# Patient Record
Sex: FEMALE
[De-identification: names, ages, dates, MRNs, and addresses within clinical notes are randomized per-mention and may not be internally consistent; named-entity substitution may affect disease eponyms.]

---

## 2017-01-20 ENCOUNTER — APPOINTMENT (OUTPATIENT)
Dept: OPHTHALMOLOGY | Facility: CLINIC | Age: 72
End: 2017-01-20

## 2017-07-21 ENCOUNTER — APPOINTMENT (OUTPATIENT)
Dept: OPHTHALMOLOGY | Facility: CLINIC | Age: 72
End: 2017-07-21

## 2018-02-02 ENCOUNTER — APPOINTMENT (OUTPATIENT)
Dept: OPHTHALMOLOGY | Facility: CLINIC | Age: 73
End: 2018-02-02
Payer: MEDICARE

## 2018-02-02 PROCEDURE — 92226: CPT | Mod: 50

## 2018-02-02 PROCEDURE — 92014 COMPRE OPH EXAM EST PT 1/>: CPT

## 2018-08-10 ENCOUNTER — APPOINTMENT (OUTPATIENT)
Dept: OPHTHALMOLOGY | Facility: CLINIC | Age: 73
End: 2018-08-10
Payer: MEDICARE

## 2018-08-10 PROCEDURE — 92014 COMPRE OPH EXAM EST PT 1/>: CPT

## 2018-08-10 PROCEDURE — 92083 EXTENDED VISUAL FIELD XM: CPT

## 2018-08-10 PROCEDURE — 92133 CPTRZD OPH DX IMG PST SGM ON: CPT

## 2019-02-08 ENCOUNTER — APPOINTMENT (OUTPATIENT)
Dept: OPHTHALMOLOGY | Facility: CLINIC | Age: 74
End: 2019-02-08
Payer: MEDICARE

## 2019-02-08 PROCEDURE — 92014 COMPRE OPH EXAM EST PT 1/>: CPT

## 2019-07-24 ENCOUNTER — HOSPITAL ENCOUNTER (OUTPATIENT)
Dept: HOSPITAL 74 - JASU-ENDO | Age: 74
Discharge: HOME | End: 2019-07-24
Payer: COMMERCIAL

## 2019-08-23 ENCOUNTER — NON-APPOINTMENT (OUTPATIENT)
Age: 74
End: 2019-08-23

## 2019-08-23 ENCOUNTER — APPOINTMENT (OUTPATIENT)
Dept: OPHTHALMOLOGY | Facility: CLINIC | Age: 74
End: 2019-08-23
Payer: MEDICARE

## 2019-08-23 PROCEDURE — 92014 COMPRE OPH EXAM EST PT 1/>: CPT

## 2019-08-27 ENCOUNTER — HOSPITAL ENCOUNTER (EMERGENCY)
Dept: HOSPITAL 74 - JER | Age: 74
Discharge: HOME | End: 2019-08-27
Payer: COMMERCIAL

## 2019-08-27 VITALS — TEMPERATURE: 98.4 F | HEART RATE: 84 BPM | SYSTOLIC BLOOD PRESSURE: 134 MMHG | DIASTOLIC BLOOD PRESSURE: 78 MMHG

## 2019-08-27 VITALS — BODY MASS INDEX: 34.7 KG/M2

## 2019-08-27 DIAGNOSIS — Z87.19: ICD-10-CM

## 2019-08-27 DIAGNOSIS — R51: Primary | ICD-10-CM

## 2019-08-27 DIAGNOSIS — I10: ICD-10-CM

## 2019-08-27 LAB
ALBUMIN SERPL-MCNC: 4.2 G/DL (ref 3.4–5)
ALP SERPL-CCNC: 81 U/L (ref 45–117)
ALT SERPL-CCNC: 19 U/L (ref 13–61)
ANION GAP SERPL CALC-SCNC: 8 MMOL/L (ref 8–16)
AST SERPL-CCNC: 12 U/L (ref 15–37)
BASOPHILS # BLD: 0.6 % (ref 0–2)
BILIRUB SERPL-MCNC: 0.4 MG/DL (ref 0.2–1)
BUN SERPL-MCNC: 12.8 MG/DL (ref 7–18)
CALCIUM SERPL-MCNC: 9.7 MG/DL (ref 8.5–10.1)
CHLORIDE SERPL-SCNC: 108 MMOL/L (ref 98–107)
CO2 SERPL-SCNC: 27 MMOL/L (ref 21–32)
CREAT SERPL-MCNC: 0.8 MG/DL (ref 0.55–1.3)
DEPRECATED RDW RBC AUTO: 13.6 % (ref 11.6–15.6)
EOSINOPHIL # BLD: 0.9 % (ref 0–4.5)
GLUCOSE SERPL-MCNC: 97 MG/DL (ref 74–106)
HCT VFR BLD CALC: 39 % (ref 32.4–45.2)
HGB BLD-MCNC: 13.4 GM/DL (ref 10.7–15.3)
LYMPHOCYTES # BLD: 37 % (ref 8–40)
MCH RBC QN AUTO: 30.5 PG (ref 25.7–33.7)
MCHC RBC AUTO-ENTMCNC: 34.3 G/DL (ref 32–36)
MCV RBC: 89 FL (ref 80–96)
MONOCYTES # BLD AUTO: 4.5 % (ref 3.8–10.2)
NEUTROPHILS # BLD: 57 % (ref 42.8–82.8)
PLATELET # BLD AUTO: 252 K/MM3 (ref 134–434)
PMV BLD: 8.3 FL (ref 7.5–11.1)
POTASSIUM SERPLBLD-SCNC: 3.8 MMOL/L (ref 3.5–5.1)
PROT SERPL-MCNC: 8 G/DL (ref 6.4–8.2)
RBC # BLD AUTO: 4.38 M/MM3 (ref 3.6–5.2)
SODIUM SERPL-SCNC: 143 MMOL/L (ref 136–145)
WBC # BLD AUTO: 6.6 K/MM3 (ref 4–10)

## 2019-08-27 PROCEDURE — 3E0333Z INTRODUCTION OF ANTI-INFLAMMATORY INTO PERIPHERAL VEIN, PERCUTANEOUS APPROACH: ICD-10-PCS | Performed by: EMERGENCY MEDICINE

## 2019-08-27 PROCEDURE — 3E033NZ INTRODUCTION OF ANALGESICS, HYPNOTICS, SEDATIVES INTO PERIPHERAL VEIN, PERCUTANEOUS APPROACH: ICD-10-PCS | Performed by: EMERGENCY MEDICINE

## 2019-08-27 PROCEDURE — 3E033GC INTRODUCTION OF OTHER THERAPEUTIC SUBSTANCE INTO PERIPHERAL VEIN, PERCUTANEOUS APPROACH: ICD-10-PCS | Performed by: EMERGENCY MEDICINE

## 2019-08-27 NOTE — PDOC
History of Present Illness





- General


Chief Complaint: Headache


Stated Complaint: HEADACHE


Time Seen by Provider: 08/27/19 08:01





- History of Present Illness


Initial Comments: 





08/27/19 11:13


74 years old with past medical history significant for hypertension presents to 

the emergency department with 2 week history of headache. Patient gets daily 

headaches has had similar headaches to this in the past one lasting this long. 

Has been seen by a neurologist as well as a neurosurgeon had an MRI performed 

in April may which demonstrated a bone spur in her neck. Presents ED with 

moderate headache persistent constant all over her head. No are no nausea no 

vomiting no weakness no numbness no other neurologic findings. No fevers no 

chills no rashes





Symptoms are moderate persistent concent no clear exacerbating or alleviating 

factors.














Past History





- Past Medical History


Allergies/Adverse Reactions: 


 Allergies











Allergy/AdvReac Type Severity Reaction Status Date / Time


 


No Known Allergies Allergy   Verified 08/27/19 07:55











Home Medications: 


Ambulatory Orders





Aspirin [Ecotrin] 81 mg PO DAILY 07/23/19 


Docusate Sodium [Colace] 100 mg PO DAILY 07/23/19 


Esomeprazole Magnesium 20 mg PO DAILY 07/23/19 


Magnesium 200 mg PO DAILY 07/23/19 








Anemia: No


Asthma: No


Cancer: No


Cardiac Disorders: No


CVA: No


COPD: No


CHF: No


Dementia: No


Diabetes: No


GI Disorders: Yes (DIVERTICULOSIS,ACID REFLUX)


 Disorders: No


HTN: Yes


Hypercholesterolemia: Yes


Liver Disease: No


Seizures: No


Thyroid Disease: No





- Surgical History


Abdominal Surgery: No


Appendectomy: No


Cardiac Surgery: No


Cholecystectomy: Yes


Lung Surgery: No


Neurologic Surgery: No


Orthopedic Surgery: Yes (RT KNEE ARTHROSCOPY)





- Suicide/Smoking/Psychosocial Hx


Smoking Status: No


Smoking History: Never smoked


Number of Cigarettes Smoked Daily: 0


Hx Alcohol Use: No


Drug/Substance Use Hx: No


Substance Use Type: None


Hx Substance Use Treatment: No





**Review of Systems





- Review of Systems


Comments:: 





08/27/19 11:13





ROS:  A complete review of 10 out of 10 review of systems is taken and is 

negative apart from what is previously mentioned below and in the HPI.








*Physical Exam





- Vital Signs


 Last Vital Signs











Temp Pulse Resp BP Pulse Ox


 


 97.5 F L  96 H  18   147/99   99 


 


 08/27/19 07:52  08/27/19 07:52  08/27/19 07:52  08/27/19 07:52  08/27/19 07:52














- Physical Exam


Comments: 





08/27/19 11:13








Vitals: Triage Vital signs reviewed  


General Appearance:  no acute distress, well nourished well developed, 


Head: Atraumatic, 


Eyes: Pupils equal reactive round, extraocular movement intact


Neck:  Supple;No Nucal rigidity


Chest Wall: Nontender


Cardiac: Regular rate and rhythym, no murmurs, no rubs, no gallops, 


Lungs: Clear to auscultation bilateral, good air movement bilaterally,


Abdomen:  Soft, non distended, normal bowel sounds, non tender to palpation


Extremities: Full range of motion to all extremities, no cyanosis, clubbing, or 

edema


Skin:  Warm and dry, no rashes or lesions, no rash, no petechiae


Neuro: AOX3; Cranial Nerves 2-12 grossly intact, Strength intact to all 

extremities, Sensation intact to all extremities,gait normal


Psych:  normal mood, normal affect








ED Treatment Course





- LABORATORY


CBC & Chemistry Diagram: 


 08/27/19 08:30





 08/27/19 08:30





Medical Decision Making





- Medical Decision Making





08/27/19 11:14





Well-appearing no apparent distress no red flags on patient's headache history. 

We'll treat with IV fluids Reglan and Benadryl observe and reassess





Reevaluation 11 AM patient feels much better headache improving. Patient would 

like referral to a new neurologist





She will return to ED for any severe worsening symptoms one dose Decadron to 

prevent rebound headache given prior to discharge





Findings, the need for follow-up and strict return instructions discussed with 

patient.








*DC/Admit/Observation/Transfer


Diagnosis at time of Disposition: 


Headache


Qualifiers:


 Headache type: unspecified Headache chronicity pattern: chronic headache 

Intractability: not intractable Qualified Code(s): R51 - Headache








- Discharge Dispostion


Disposition: HOME


Condition at time of disposition: Good


Decision to Admit order: No





- Referrals


Referrals: 


Patience Dorman MD [Primary Care Provider] - 


Jose Barroso MD [Staff Physician] - 





- Patient Instructions


Printed Discharge Instructions:  DI for Headache


Additional Instructions: 


Take your home educations as prescribed. Follow-up with Dr. Barroso neurology 

this week. Return to the emergency department for any severe worsening symptoms 

or for any concerns.





- Post Discharge Activity

## 2019-12-19 ENCOUNTER — HOSPITAL ENCOUNTER (EMERGENCY)
Dept: HOSPITAL 74 - JER | Age: 74
Discharge: HOME | End: 2019-12-19
Payer: COMMERCIAL

## 2019-12-19 VITALS — HEART RATE: 74 BPM | DIASTOLIC BLOOD PRESSURE: 69 MMHG | SYSTOLIC BLOOD PRESSURE: 124 MMHG

## 2019-12-19 VITALS — TEMPERATURE: 98.4 F

## 2019-12-19 VITALS — BODY MASS INDEX: 35 KG/M2

## 2019-12-19 DIAGNOSIS — K21.9: ICD-10-CM

## 2019-12-19 DIAGNOSIS — I10: ICD-10-CM

## 2019-12-19 DIAGNOSIS — K57.92: Primary | ICD-10-CM

## 2019-12-19 LAB
ALBUMIN SERPL-MCNC: 4 G/DL (ref 3.4–5)
ALP SERPL-CCNC: 86 U/L (ref 45–117)
ALT SERPL-CCNC: 25 U/L (ref 13–61)
ANION GAP SERPL CALC-SCNC: 6 MMOL/L (ref 8–16)
APPEARANCE UR: CLEAR
AST SERPL-CCNC: 12 U/L (ref 15–37)
BACTERIA # UR AUTO: 140.9 /HPF
BILIRUB SERPL-MCNC: 0.5 MG/DL (ref 0.2–1)
BILIRUB UR STRIP.AUTO-MCNC: NEGATIVE MG/DL
BUN SERPL-MCNC: 10.6 MG/DL (ref 7–18)
CALCIUM SERPL-MCNC: 9.7 MG/DL (ref 8.5–10.1)
CASTS URNS QL MICRO: 11 /LPF (ref 0–8)
CHLORIDE SERPL-SCNC: 106 MMOL/L (ref 98–107)
CO2 SERPL-SCNC: 30 MMOL/L (ref 21–32)
COLOR UR: YELLOW
CREAT SERPL-MCNC: 0.9 MG/DL (ref 0.55–1.3)
DEPRECATED RDW RBC AUTO: 13.8 % (ref 11.6–15.6)
EPITH CASTS URNS QL MICRO: 9.6 /HPF
GLUCOSE SERPL-MCNC: 97 MG/DL (ref 74–106)
HCT VFR BLD CALC: 40.8 % (ref 32.4–45.2)
HGB BLD-MCNC: 13.8 GM/DL (ref 10.7–15.3)
KETONES UR QL STRIP: NEGATIVE
LEUKOCYTE ESTERASE UR QL STRIP.AUTO: NEGATIVE
LIPASE SERPL-CCNC: 117 U/L (ref 73–393)
MCH RBC QN AUTO: 30.5 PG (ref 25.7–33.7)
MCHC RBC AUTO-ENTMCNC: 33.9 G/DL (ref 32–36)
MCV RBC: 90.1 FL (ref 80–96)
NITRITE UR QL STRIP: NEGATIVE
PH UR: 5 [PH] (ref 5–8)
PLATELET # BLD AUTO: 253 K/MM3 (ref 134–434)
PMV BLD: 8.2 FL (ref 7.5–11.1)
POTASSIUM SERPLBLD-SCNC: 3.8 MMOL/L (ref 3.5–5.1)
PROT SERPL-MCNC: 7.8 G/DL (ref 6.4–8.2)
PROT UR QL STRIP: NEGATIVE
PROT UR QL STRIP: NEGATIVE
RBC # BLD AUTO: 4.53 M/MM3 (ref 3.6–5.2)
RBC # BLD AUTO: 6 /HPF (ref 0–4)
SODIUM SERPL-SCNC: 141 MMOL/L (ref 136–145)
SP GR UR: 1.02 (ref 1.01–1.03)
UROBILINOGEN UR STRIP-MCNC: 1 MG/DL (ref 0.2–1)
WBC # BLD AUTO: 8.5 K/MM3 (ref 4–10)
WBC # UR AUTO: 2 /HPF (ref 0–5)

## 2019-12-19 PROCEDURE — 3E0337Z INTRODUCTION OF ELECTROLYTIC AND WATER BALANCE SUBSTANCE INTO PERIPHERAL VEIN, PERCUTANEOUS APPROACH: ICD-10-PCS | Performed by: EMERGENCY MEDICINE

## 2019-12-19 NOTE — PDOC
Documentation entered by Susie Machado SCRIBE, acting as scribe for Kamron Perez MD.








Kamron Perez MD:  This documentation has been prepared by the юлияibeCarter Lincy, SCRIBE, under my direction and personally reviewed by me in its 

entirety.  I confirm that the documentation accurately reflects all work, 

treatment, procedures, and medical decision making performed by me.  





Attending Attestation





- Resident


Resident Name: MiltonMitul





- ED Attending Attestation


I have performed the following: I have examined & evaluated the patient, The 

case was reviewed & discussed with the resident, I agree w/resident's findings 

& plan, Exceptions are as noted





- HPI


HPI: 


12/19/19 09:36


The patient is a 74-year-old female with a past medical history significant for 

hx of diverticulosis, HTN, and migraines who presents to the emergency 

department with 2 weeks of worsening suprapubic, left lower quadrant and left 

back pain. The patient reports associated symptoms of nausea and an episode of 

bright red blood noted on toilet paper earlier today. The patient reports 

speaking with Dr. Molina, who referred the patient to the ER for further 

management. 





Allergies: NKDA


Social history: No reported history of tobacco, alcohol or recreational drug 

use. 


Surgical history: R. knee arthroscopy and cholecystectomy. 


PCP: Dr. JOSE ANTONIO Dorman. 





- Physicial Exam


PE: 


12/19/19 10:00


Vitals: Triage vital signs reviewed


General Appearance: No acute distress, well nourished, well developed


Cardiac: Regular rate and rhythm, no murmurs, no rubs, no gallops


Lungs: Clear to auscultation bilaterally, good air movement bilaterally


Abdomen:  +Left lower quadrant tenderness. Soft, nondistended, normal bowel 

sounds. 


Extremities: Full range of motion to all extremities, no cyanosis, clubbing, or 

edema


Skin:  Warm and dry, no rashes or lesions, no rash, no petechiae


Psych: Normal mood, normal affect. 





- Medical Decision Making





12/19/19 17:01


History and examination consistent with diverticulitis CAT scan demonstrates 

diverticulitis no fever no white count patient well-appearing no apparent 

distress but initial lactate elevated 3.5 this is secondary to diarrhea and 

volume loss





Patient given 2 L normal saline her lactate was rechecked and has now 

normalized reevaluation patient feels well we will discharge home on p.o. 

antibiotics with strict return instructions





Findings, the need for follow-up and strict return instructions discussed with 

patient.





Discharge





- Discharge Information


Problems reviewed: No


Clinical Impression/Diagnosis: 


 Diverticulitis





Disposition: HOME





- Admission


No





- Follow up/Referral


Referrals: 


Patience Dorman MD [Primary Care Provider] - 





- Patient Discharge Instructions


Patient Printed Discharge Instructions:  Diverticulitis


Additional Instructions: 


Take antibiotics as prescribed.  Take with an over-the-counter probiotic.  

Return to ED for any fever severe worsening symptoms or for any concerns.  

Follow-up with your doctor within 1 week.





- Post Discharge Activity

## 2019-12-19 NOTE — PDOC
History of Present Illness





- General


Chief Complaint: Pain


Stated Complaint: ABD PAIN


Time Seen by Provider: 12/19/19 08:35


History Source: Patient


Exam Limitations: No Limitations





- History of Present Illness


Initial Comments: 


73 y/o F, pmh of htn, constipation, migraines, GERD, presents today with diffuse

, intermittent, sharp abdominal pain radiating to the suprapubic region of 1 

week duration that is relieved by motrin, associated nbnb, semisolid diarrhea. 

Pt reports that the pain has worsened since onset, and usually resolves after 

moving her bowel. She reports that the diarrhea has also worsened, which she 

attributed to taking miralax daily. In the past, she has been seeing Dr. Roy and had a colonoscopy + endoscopy showing no significant findings. She 

also reports one episode of blood streaked stool on toilet paper. Pt Admits to 

mild chest pain, chills, diarrhea, dysuria and nausea. Shes denies f/v/sob, 

numbness or tingling, back pain, recent travels. 





12/19/19 10:10





12/19/19 10:14





12/19/19 10:21





12/19/19 10:23





12/19/19 10:34





Severity: moderate


Associated Symptoms: reports: chest pain, headaches, nausea/vomiting.  denies: 

diaphoresis, fever/chills, loss of appetite, rash, shortness of breath


Aspirin Received prior to arrival: Yes: no aspirin today





Past History





- Travel


Traveled outside of the country in the last 30 days: No


Close contact w/someone who was outside of country & ill: No





- Past Medical History


Allergies/Adverse Reactions: 


 Allergies











Allergy/AdvReac Type Severity Reaction Status Date / Time


 


No Known Allergies Allergy   Verified 12/19/19 08:24











Home Medications: 


Ambulatory Orders





Aspirin [Ecotrin] 81 mg PO DAILY 07/23/19 


Docusate Sodium [Colace] 100 mg PO DAILY 07/23/19 


Esomeprazole Magnesium 20 mg PO DAILY 07/23/19 


Magnesium 200 mg PO DAILY 07/23/19 


Ciprofloxacin 500 mg PO BID 7 Days #14 ml 12/19/19 


Metronidazole 500 mg PO TID 7 Days #21 tablet 12/19/19 








Anemia: No


Asthma: No


Cancer: No


Cardiac Disorders: No


CVA: No


COPD: No


CHF: No


Dementia: No


Diabetes: No


GI Disorders: Yes (DIVERTICULOSIS,ACID REFLUX)


 Disorders: No


HTN: Yes


Hypercholesterolemia: Yes


Liver Disease: No


Seizures: No


Thyroid Disease: No





- Surgical History


Abdominal Surgery: No


Appendectomy: No


Cardiac Surgery: No


Cholecystectomy: Yes


Lung Surgery: No


Neurologic Surgery: No


Orthopedic Surgery: Yes (RT KNEE ARTHROSCOPY)





- Psycho Social/Smoking Cessation Hx


Smoking Status: No


Smoking History: Never smoked


Number of Cigarettes Smoked Daily: 0


Information on smoking cessation initiated: No


Hx Alcohol Use: No


Drug/Substance Use Hx: No


Substance Use Type: None


Hx Substance Use Treatment: No





**Review of Systems





- Review of Systems


Able to Perform ROS?: Yes


Is the patient limited English proficient: No


Constitutional: Yes: Chills, Weight Stable.  No: Diaphoresis, Fever, Loss of 

Appetite


HEENTM: No: Blurred Vision, Difficulty Swallowing


Respiratory: No: Cough, Shortness of Breath, SOB with Exertion, Wheezing, 

Productive cough


Cardiac (ROS): Yes: Chest Pain.  No: Palpitations, Chest Tightness


ABD/GI: Yes: Blood Streaked Bowels, Diarrhea, Nausea.  No: Abdominal Distended, 

Constipated, Rectal Bleeding, Vomiting, Abdominal cramping, Tarry Stools


: Yes: Burning, Dysuria


Musculoskeletal: Yes: Back Pain.  No: Muscle Pain, Joint Stiffness


Neurological: Yes: Headache.  No: Weakness





*Physical Exam





- Vital Signs


 Last Vital Signs











Temp Pulse Resp BP Pulse Ox


 


    111 H  19   134/87   98 


 


    12/19/19 08:22  12/19/19 08:22  12/19/19 08:22  12/19/19 08:22














- Physical Exam


General Appearance: Yes: Nourished, Appropriately Dressed


HEENT: positive: EOMI, ZUNILDA, Normal ENT Inspection, Pharynx Normal


Neck: positive: Trachea midline, Normal Thyroid, Supple


Respiratory/Chest: positive: Lungs Clear, Normal Breath Sounds.  negative: 

Chest Tender, Crackles, Rales, Wheezing


Cardiovascular: positive: Regular Rhythm, Regular Rate, S1, S2, Tachycardia.  

negative: Murmur, Gallop/S3, Gallop/S4


Vascular Pulses: Dorsalis-Pedis (R): 2+, Doralis-Pedis (L): 2+


Gastrointestinal/Abdominal: positive: Normal Bowel Sounds, Tender, Guarding, 

Tenderness.  negative: Organomegaly


Musculoskeletal: positive: CVA Tenderness (Left sided)


Extremity: positive: Normal Inspection, Normal Range of Motion


Neurologic: positive: CNs II-XII NML intact, Fully Oriented, Alert, Normal Mood/

Affect





ED Treatment Course





- LABORATORY


CBC & Chemistry Diagram: 


 12/19/19 10:24





 12/19/19 10:24





- RADIOLOGY


Radiology Studies Ordered: 














 Category Date Time Status


 


 ABDOMEN & PELVIS CT WITH CONTR [CT] Stat CT Scan  12/19/19 09:21 Ordered














Medical Decision Making





- Medical Decision Making


12/19/19 1





73 y/o F, pmh of htn, constipation, migraines, GERD, presents today with diffuse

, intermittent, sharp abdominal pain radiating to the suprapubic region of 1 

week duration, associated nbnb, semisolid diarrhea 





#Abdominal pain 


likely 2/2 Diverticulitis 


r/o acute pathology due to increased risk, 


(>66 yo pts w/ abdominal pain at 10% risk of mortality)


previous Endoscopy shows multiple diverticulosis 


LLQ >RLL pain


CT a/p w/ IV contrast ordered


Lactate orderd to r/o bowel perforation vs ischemia


Lipase


CBC, CMP


Can give tylenol for pain





#Dysuria


likely 2/2 to UTI vs pyelo 


suprapubic tenderness + left CVA tenderness


UA + UCx ordered





#Chest pain


unlikely ACS, but must r/o


EKG


Trops ordered





#Hematochezia


Rectal exam


Heme occult ordered





FEN:


monitor lytes





Dispo: f/u CT a/p w/ IV contrast, f/u labs








Discharge





- Discharge Information


Problems reviewed: Yes


Clinical Impression/Diagnosis: 


 Diverticulitis





Condition: Improved


Disposition: HOME





- Additional Discharge Information


Prescriptions: 


Ciprofloxacin 500 mg PO BID 7 Days #14 ml


Metronidazole 500 mg PO TID 7 Days #21 tablet





- Follow up/Referral


Referrals: 


Patience Dorman MD [Primary Care Provider] - 





- Patient Discharge Instructions


Patient Printed Discharge Instructions:  Diverticulitis


Additional Instructions: 


Take antibiotics as prescribed.  Take with an over-the-counter probiotic.  

Return to ED for any fever severe worsening symptoms or for any concerns.  

Follow-up with your doctor within 1 week.





- Post Discharge Activity

## 2019-12-19 NOTE — EKG
Test Reason : 

Blood Pressure : ***/*** mmHG

Vent. Rate : 077 BPM     Atrial Rate : 077 BPM

   P-R Int : 158 ms          QRS Dur : 082 ms

    QT Int : 402 ms       P-R-T Axes : 041 -25 010 degrees

   QTc Int : 454 ms

 

NORMAL SINUS RHYTHM

POSSIBLE LEFT ATRIAL ENLARGEMENT

BORDERLINE ECG

WHEN COMPARED WITH ECG OF 08-DEC-2016 10:01,

FUSION COMPLEXES ARE NO LONGER PRESENT

Confirmed by ALICE JACOBS, DANGELO ([id number]) on 12/19/2019 11:53:01 AM

 

Referred By:             Confirmed By:DANGELO JENKINS MD

## 2020-02-14 ENCOUNTER — APPOINTMENT (OUTPATIENT)
Dept: OPHTHALMOLOGY | Facility: CLINIC | Age: 75
End: 2020-02-14
Payer: MEDICARE

## 2020-02-14 ENCOUNTER — NON-APPOINTMENT (OUTPATIENT)
Age: 75
End: 2020-02-14

## 2020-02-14 PROCEDURE — 92133 CPTRZD OPH DX IMG PST SGM ON: CPT

## 2020-02-14 PROCEDURE — 92012 INTRM OPH EXAM EST PATIENT: CPT

## 2020-02-14 PROCEDURE — 92083 EXTENDED VISUAL FIELD XM: CPT

## 2020-08-21 ENCOUNTER — APPOINTMENT (OUTPATIENT)
Dept: OPHTHALMOLOGY | Facility: CLINIC | Age: 75
End: 2020-08-21

## 2020-08-21 ENCOUNTER — NON-APPOINTMENT (OUTPATIENT)
Age: 75
End: 2020-08-21

## 2020-08-21 ENCOUNTER — APPOINTMENT (OUTPATIENT)
Dept: OPHTHALMOLOGY | Facility: CLINIC | Age: 75
End: 2020-08-21
Payer: MEDICARE

## 2020-08-21 PROCEDURE — 92014 COMPRE OPH EXAM EST PT 1/>: CPT

## 2020-11-02 ENCOUNTER — HOSPITAL ENCOUNTER (EMERGENCY)
Dept: HOSPITAL 74 - JER | Age: 75
Discharge: HOME | End: 2020-11-02
Payer: COMMERCIAL

## 2020-11-02 VITALS — TEMPERATURE: 98.2 F

## 2020-11-02 VITALS — BODY MASS INDEX: 32.3 KG/M2

## 2020-11-02 VITALS — DIASTOLIC BLOOD PRESSURE: 74 MMHG | SYSTOLIC BLOOD PRESSURE: 135 MMHG

## 2020-11-02 VITALS — HEART RATE: 74 BPM

## 2020-11-02 DIAGNOSIS — R10.31: Primary | ICD-10-CM

## 2020-11-02 LAB
ALBUMIN SERPL-MCNC: 4.1 G/DL (ref 3.4–5)
ALP SERPL-CCNC: 78 U/L (ref 45–117)
ALT SERPL-CCNC: 17 U/L (ref 13–61)
ANION GAP SERPL CALC-SCNC: 7 MMOL/L (ref 8–16)
APPEARANCE UR: (no result)
APTT BLD: 28.4 SECONDS (ref 25.2–36.5)
AST SERPL-CCNC: 15 U/L (ref 15–37)
BASOPHILS # BLD: 0.3 % (ref 0–2)
BILIRUB SERPL-MCNC: 0.4 MG/DL (ref 0.2–1)
BILIRUB UR STRIP.AUTO-MCNC: NEGATIVE MG/DL
BUN SERPL-MCNC: 8.6 MG/DL (ref 7–18)
CALCIUM SERPL-MCNC: 10.1 MG/DL (ref 8.5–10.1)
CHLORIDE SERPL-SCNC: 108 MMOL/L (ref 98–107)
CO2 SERPL-SCNC: 27 MMOL/L (ref 21–32)
COLOR UR: YELLOW
CREAT SERPL-MCNC: 0.8 MG/DL (ref 0.55–1.3)
DEPRECATED RDW RBC AUTO: 13.8 % (ref 11.6–15.6)
EOSINOPHIL # BLD: 0.7 % (ref 0–4.5)
GLUCOSE SERPL-MCNC: 91 MG/DL (ref 74–106)
HCT VFR BLD CALC: 41.7 % (ref 32.4–45.2)
HGB BLD-MCNC: 13.7 GM/DL (ref 10.7–15.3)
INR BLD: 1.18 (ref 0.83–1.09)
KETONES UR QL STRIP: (no result)
LEUKOCYTE ESTERASE UR QL STRIP.AUTO: NEGATIVE
LYMPHOCYTES # BLD: 30.9 % (ref 8–40)
MCH RBC QN AUTO: 29.7 PG (ref 25.7–33.7)
MCHC RBC AUTO-ENTMCNC: 33 G/DL (ref 32–36)
MCV RBC: 90 FL (ref 80–96)
MONOCYTES # BLD AUTO: 5.1 % (ref 3.8–10.2)
NEUTROPHILS # BLD: 63 % (ref 42.8–82.8)
NITRITE UR QL STRIP: NEGATIVE
PH UR: 5 [PH] (ref 5–8)
PLATELET # BLD AUTO: 242 K/MM3 (ref 134–434)
PMV BLD: 8.3 FL (ref 7.5–11.1)
POTASSIUM SERPLBLD-SCNC: 4.5 MMOL/L (ref 3.5–5.1)
PROT SERPL-MCNC: 7.8 G/DL (ref 6.4–8.2)
PROT UR QL STRIP: (no result)
PROT UR QL STRIP: NEGATIVE
PT PNL PPP: 14.2 SEC (ref 9.7–13)
RBC # BLD AUTO: 4.63 M/MM3 (ref 3.6–5.2)
SODIUM SERPL-SCNC: 142 MMOL/L (ref 136–145)
SP GR UR: 1.02 (ref 1.01–1.03)
UROBILINOGEN UR STRIP-MCNC: 0.2 MG/DL (ref 0.2–1)
WBC # BLD AUTO: 8.6 K/MM3 (ref 4–10)

## 2020-11-02 PROCEDURE — 3E0337Z INTRODUCTION OF ELECTROLYTIC AND WATER BALANCE SUBSTANCE INTO PERIPHERAL VEIN, PERCUTANEOUS APPROACH: ICD-10-PCS

## 2020-11-02 PROCEDURE — 3E0333Z INTRODUCTION OF ANTI-INFLAMMATORY INTO PERIPHERAL VEIN, PERCUTANEOUS APPROACH: ICD-10-PCS

## 2020-11-02 NOTE — PDOC
Attending Attestation





- Resident


Resident Name: PaddymanuelClarke





- ED Attending Attestation


I have performed the following: I have examined & evaluated the patient, The 

case was reviewed & discussed with the resident, I agree w/resident's findings &

plan, Exceptions are as noted





- HPI


HPI: 





11/02/20 09:47


75 y F hx of diverticulitis, htn, migraines presents with R abd pain since 

yesterday. Pt woke up with the pain, the pain is constaint, radiates up to the R

flank, associated with nausea. pt endorses some constipation and has beeen using

a laxative tea and has been having several episodes of loose watery non 

bloody/non melantic stool. Pt dose endorses some urinary frequency, was recently

on keflex for a UTI from his PMD. 





PMD: Dr. Dorman














- Physicial Exam


PE: 





11/02/20 10:38


GENERAL: The patient is awake, alert, and fully oriented, Nontoxic - in no acute

distress.


HEAD: Normocephalic, atraumatic.


EYES: extraocular movements intact, sclera anicteric, conjunctiva clear.


ENT: Normal voice,  Moist mucous membranes.


NECK: Normal range of motion, supple


LUNGS: Breath sounds equal, clear to auscultation bilaterally.  No wheezes, no 

rhonchi, no rales.


HEART: tachcyardic, normal S1 and S2 without murmur, rub or gallop.


ABDOMEN: Soft, mild RLQ tenderness, No guarding, no rebound.  No CVA tenderness


EXTREMITIES: Normal range of motion, no edema.  


NEUROLOGICAL: No facial assymetry, Normal speech, 


PSYCH: Normal mood, normal affect.


SKIN: Warm, Dry, normal turgor,








- Medical Decision Making





11/02/20 10:42


ddx includes possible appendicitis, kidney stones


will obtain blood work, ua, ekg


analgesia, fluids


likely imaging





11/02/20 10:57


pts labs reviewed


noted for elevated LA to 3.0


will continue to fluid resusitate


will reassess





11/02/20 15:56


Pt feeling improved


CT non diagnostic


LA repeated and was normal


will dc with outpt fu


return precautiosn were discussed











**Heart Score/ECG Review





- ECG Impressions


Comment:: 





11/02/20 11:00


Twelve-lead EKG was performed and reviewed by me. 


There is normal sinus rhythm with a rate of 101


left axis deviation


The intervals are normal. 





Impression: sinus tachcyardia 





Discharge





- Discharge Information


Problems reviewed: Yes


Clinical Impression/Diagnosis: 


 RLQ abdominal pain





Condition: Good


Disposition: HOME





- Admission


No





- Follow up/Referral


Referrals: 


Patience Dorman MD [Primary Care Provider] - 





- Patient Discharge Instructions


Additional Instructions: 


You came to the ED for RLQ pain this is most likely due to diverticulosis or 

musculoskeletal pain. 





At the ED we did labs and imaging which were all benign. We gave you pain meds. 

For this pain follow up with your gastroenterologist within the next few days. 

Please follow up with your Primary care physician as well with this pain. 





For the pain continue taking ibuprofen 400mg every 6 hours or tylenol 325mg-

650mg every 6 hours for pain





For any of these please return:


- worsening abdominal pain


-fevers or chills


- SOB or chest pain


- unable to eat anything by mouth 


For any emergent conditions please call for medical help right away. 





- Post Discharge Activity

## 2020-11-02 NOTE — XMS
Summarization Of Episode

                           Created on:2020



Patient:GARY GILLESPIE

Sex:Female

:1945

External Reference #:[id number]





Demographics







                          Address                   [address]



                                                    [place] [address]

 

                          Home Phone                [phone] CELL

 

                          Work Phone                [phone]

 

                          Email Address             [email or web address]

 

                          Preferred Language        English

 

                          Marital Status            Not  or 

 

                          Anabaptist Affiliation     NO

 

                          Race                      BL

 

                          Ethnic Group              Not  or 









Author







                          Organization              [facility]









Support







                Name            Relationship    Address         Phone

 

                FUAD GILLESPIE   3               NOT AVAIL.PER PATIENT [phone]

 

                RE              Unavailable     Unavailable     Unavailable

 

                BRADY JONES  FRIEND          [address] [phone]



                                                [place] [address] 

 

                EDDA GILLESPIE    SON             [phone] [phone] 

CELL



                                                [place] [id number] 

 

                BRADY JONES  Unavailable     [address]  [phone]



                                                [place] [address] 

 

                FUAD GILLESPIE   Unavailable     NOT AVAIL.PER PATIENT [phone]









Care Team Providers







                    Name                Role                Phone

 

                    Jose Choudhury   Unavailable         Unavailable

 

                    Coron, Parvez        Unavailable         Unavailable

 

                    Coron, Parvez        Unavailable         Unavailable

 

                    Coron, Parvez        Unavailable         Unavailable

 

                    Coron, Parvez        Unavailable         Unavailable









Re-disclosure Warning

The records that you are about to access may contain information from federally-
assisted alcohol or drug abuse programs. If such information is present, then 
the following federally mandated warning applies: This information has been 
disclosed to you from records protected by federal confidentiality rules (42 CFR
part 2). The federal rules prohibit you from making any further disclosure of 
this information unless further disclosure is expressly permitted by the written
consent of the person to whom it pertains or as otherwise permitted by 42 CFR 
part 2. A general authorization for the release of medical or other information 
is NOT sufficient for this purpose. The Federal rules restrict any use of the 
information to criminally investigate or prosecute any alcohol or drug abuse 
patient.The records that you are about to access may contain highly sensitive 
health information, the redisclosure of which is protected by Article 27-F of 
the [facility] Public Health law. If you continue you may haveaccess to 
information: Regarding HIV / AIDS; Provided by facilities licensed or operated 
by the [facility] Office of Mental Health; or Provided by the [facility]
Office for People With Developmental Disabilities. If such information is 
present, then the following [place] mandated warning applies: This 
information has been disclosed to you from confidential records which are 
protected by state law. State law prohibits you from making any further 
disclosure of this information without the specific written consent of the 
person to whom it pertains, or as otherwise permitted by law. Any unauthorized 
further disclosure in violation of state law may result in a fine or [facility]PC 
sentence or both. A general authorization for the release of medical or other 
information is NOT sufficient authorization for further disclosure.



Encounters







           Encounter  Providers  Location   Date       Indications Data Source(s

)

 

           Outpatient Attender: Parvez ICU[facility]  10/21/2020            [facility]



                      Coron                 10:00:00 AM            Hospital



                                            EDT -                 



                                            10/21/2020            



                                            11:59:00 PM            



                                            EDT                   









                                        Patient discharged.









           Outpatient Attender: Jose ICU-LAB    2020 03:33:00 PM       

     [facility]



                      [place]              EDT - 2020            Hospital



                                            11:59:00 PM EDT            









                                        Patient discharged.









           Outpatient Attender: Parvez Olvera ICU-RADIO  2020 02:24:52 PM   

         [facility] 

[facility]



                                            EDT - 2020            Hospital



                                            11:59:00 PM EDT            









                                        Patient discharged.









           Outpatient Attender: Parvez Olvera ICU-LAB    2020 04:23:00 PM   

         [facility] 

[facility]



                                            EDT - 2020            Hospital



                                            11:59:00 PM EDT            









                                        Patient discharged.









           Outpatient Attender: Parvez Olvera ICU-LAB    2020 06:45:18 PM   

         [facility] 

[facility]



                                            EDT - 2020            Hospital



                                            11:59:00 PM EDT            









                                        Patient discharged.









           S          Attender: Parvez Olvera ICU-GI SUITE 2020 03:47:01 PM 

[facility]     [facility] 

[facility]



                                            EDT - 2020            Hospital



                                            10:24:00 AM EDT            









                                        [facility]STRO

 

                                        Patient discharged.







Insurance Providers







          Payer name Policy type / Policy ID Covered   Covered party's Policy   

 Plan



                    Coverage type           party ID  relationship to Rodriguez    

Information



                                                  rodriguez              

 

          [employer] CBP OUTPT           [id number]           SP                  [id number]

[id number]

 

          BC PPO              [id number]           SP                  [id number]

[id number]

 

          MEDICARE            [id number]           SP                  [id number]

[id number]

 

          [place] [employer] [id number]           1                   [id number]

[id number]



          [employer]                                                         



          PPO/EPO/HMO                                                   

 

          Medicare Part Medicare  [id number]           1                   [id number]

[id number]



          B Outpatient                                                   

 

          Blue [employer] Blue Cross [id number]           1                   [id number]

[id number]



          PPO                                                         

 

          BC PPO              [id number]           SP                  [id number]

[id number]

 

          [employer] CBP OUTPT           [id number]           SP                  [id number]

[id number]

 

          MEDICARE            [id number]           SP                  [id number]

A

 

          Blue Cross Blue Cross [id number]           1                   [id number]

[id number]



          PPO                                                         

 

          BC PPO              [id number]           SP                  [id number]

[id number]







Problems, Conditions, and Diagnoses







           Code       Display Name Description Problem Type Effective  Data



                                                       Dates      Source(s)

 

           N28.1      Cyst of kidney, Acquired renal cyst Diagnosis  10/21/2020 

[facility]



                      acquired   of left kidney            10:00:00 AM [facility]

 

           K35.80     Unspecified acute Unspecified acute Diagnosis  2020 

[facility]



                      appendicitis appendicitis            03:33:00 PM [facility]

 

           K57.92     Diverticulitis of Diverticulitis of Diagnosis  2020 

[facility]



                      intestine, part intestine             08:32:00 AM [facility]



                      unspecified, without                       EDT        Hosp

ital



                      perforation or                                  



                      abscess without                                  



                      bleeding                                    

 

           R10.9      Unspecified Abdominal pain, Diagnosis  2020 [facility]

w



                      abdominal pain unspecified            04:23:00 PM [facility]



                                 abdominal location            EDT        Hospit

al

 

           K29.60     Other gastritis Other gastritis Diagnosis  2020 [facility] 

[facility]



                      without bleeding without hemorrhage            08:04:00 AM

 [facility]

 

           K21.0      Gastro-esophageal Gastroesophageal Diagnosis  2020 

[facility]



                      reflux disease with reflux disease with            08:04:0

0 AM [facility]



                      esophagitis esophagitis            EDT        Hospital

 

                      GASTRO     GASTRO     Diagnosis  2020 [facility]



                                                       08:04:00 AM [facility]

 

           [address]      Upper      Upper      Diagnosis  2020 [facility]



                      gastrointestinal gastrointestinal            08:04:00 AM [facility]



                      endoscopy  endoscopy             EDT        Hospital

 

           K44.9      Diaphragmatic hernia Diaphragmatic hernia Diagnosis   [facility]



                      without obstruction without obstruction            08:04:0

0 AM [facility]



                      or gangrene and without gangrene            EDT        Hos

pital

 

           Z11.59     Encounter for Encounter for Diagnosis  2020 [facility]

w



                      screening for other laboratory testing            06:46:00

 AM [facility]



                      viral diseases for COVID-19 virus            EDT        Ho

spital







Surgeries/Procedures







             Procedure    Description  Date         Indications  Data Source(s)

 

             MRI Abdomen with and              10/21/2020                [facility]



             without Contrast MRI              10:10:00 AM EDT              Syst

em



             Abdomen with and without              - 10/21/2020              



             Contrast                  10:10:00 AM EDT              

 

             Venipuncture              2020                [facility]



                                       03:37:39 PM EDT              System



                                       - 2020              



                                       05:00:07 PM EDT              

 

             CT Abdomen and Pelvis              2020                [facility]



             with IV and Barium              11:34:00 AM EDT              System



             Contrast & Recons Rad              - 2020              



             Image                     11:34:00 AM EDT              

 

             Venipuncture              2020                [facility]



                                       04:25:03 PM EDT              System



                                       - 2020              



                                       06:00:10 PM EDT              







Results







                    ID                  Date                Data Source

 

                    [id number]      10/27/2020 02:43:52 AM EDT [facility]

alth System











          Name      Value     Range     Interpretation Description Data      Sup

porting



                                        Code                Source(s) Document(s

)

 

          TissueExam Results for case           Normal (applies [facility]

[facility] 



                    # [id number]           to non-[facility]           Health    



                    SURGICAL            results)            System    



                    PATHOLOGY                                         



                    REPORTCLINICAL                                         



                    INFORMATION:                                         



                    GERD.                                             



                    PREOPERATIVE                                         



                    DIAGNOSIS:                                         



                    Same.POSTOPERATIV                                         



                    E DIAGNOSIS:                                         



                    Gastritis.                                         



                    Hiatal                                            



                    hernia.FINAL                                         



                    DIAGNOSIS:                                         



                    Stomach, antrum,                                         



                    biopsy:Antral and                                         



                    body/fundic type                                         



                    gastric mucosa                                         



                    with chronic                                         



                    inactive                                          



                    gastritis with                                         



                    focal mild                                         



                    glandular atypia,                                         



                    favor reactive                                         



                    change.Negative                                         



                    for Helicobacter                                         



                    pylori on Giemsa                                         



                    stain./Jaiden Ahumada MDElectronically                                         



                    Signed By: GROSS                                         



                    DESCRIPTION: In                                         



                    formalin, labeled                                         



                    "biopsy of                                         



                    gastric                                           



                    antrum&quot;, the                                         



                    specimen consists                                         



                    of three tan soft                                         



                    tissues, ranging                                         



                    from 0.1 x 0.1 x                                         



                    0.1 cm to 0.3 x                                         



                    0.1 x 0.1 cm,                                         



                    which are                                         



                    submitted in toto                                         



                    in one                                            



                    cassette.MV/cmPag                                         



                    e                                           











                    ID                  Date                Data Source

 

                    [id number]      10/27/2020 02:43:52 AM EDT [facility]

alth System











          Name      Value     Range     Interpretation Description Data      Sup

porting



                                        Code                Source(s) Document(s

)

 

          46853-3   NEGATIVE Testing           Normal (applies COVID-19.. [facility]

iore 



                    was performed           to non-numeric           Health Syst

em 



                    using [employer] ID           results)                      



                    NOW COVID-19, an                                         



                    isothermal                                         



                    nucleic acid                                         



                    amplification                                         



                    technology for                                         



                    the qualitative                                         



                    detection of                                         



                    nucleic acid from                                         



                    the SARS-CoV-2                                         



                    viral RNA in                                         



                    respiratory                                         



                    specimens.  The                                         



                    ID NOW COVID-19                                         



                    test has been                                         



                    approved by the                                         



                    Food and Drug                                         



                    Administration                                         



                    (FDA) under an                                         



                    Emergency Use                                         



                    Authorization for                                         



                    use by authorized                                         



                    laboratories.                                         



                    Reference Range:                                         



                    NEGATIVE                                          



                              .                                         











                    ID                  Date                Data Source

 

                    [id number]      10/27/2020 02:43:52 AM EDT [facility]

alth System











          Name      Value     Range     Interpretation Description Data      Sup

porting



                                        Code                Source(s) Document(s

)

 

          TissueExam Results for case #           Normal (applies Tissue Exam [facility]

[facility] 



                    [id number]           to non-numeric           Health    



                    SURGICAL PATHOLOGY           results)            System    



                    REPORTCLINICAL                                         



                    INFORMATION:                                         



                    Gastroesophageal                                         



                    reflux                                            



                    disease.PREOPERATI                                         



                    VE DIAGNOSIS:                                         



                    Same.                                             



                    POSTOPERATIVE                                         



                    DIAGNOSIS:                                         



                    Gastritis, hiatal                                         



                    hernia, rule out                                         



                    Silva's                                         



                    esophagus. FINAL                                         



                    DIAGNOSIS: A:                                         



                    Stomach,                                          



                    biopsy:Transitiona                                         



                    l and oxyntic                                         



                    mucosa with focal                                         



                    lamina propria                                         



                    vascular                                          



                    congestion and                                         



                    regenerative                                         



                    epithelial                                         



                    changes.B: GE                                         



                    junction,                                         



                    biopsy:Esophageal                                         



                    squamous mucosa                                         



                    with mild reflux                                         



                    changes.No                                         



                    glandular mucosa                                         



                    present.GO/stGIBUCK PINTO MDElectronically                                         



                    Signed By: GROSS                                         



                    DESCRIPTION: A: In                                         



                    formalin, labeled                                         



                    "gastric biopsy",                                         



                    the specimen                                         



                    consists of 2 tan                                         



                    soft tissue which                                         



                    are 0.2 x 0.1 x                                         



                    0.1cm and 0.3 x                                         



                    0.1 x 0.1cm which                                         



                    are submitted in                                         



                    toto in one                                         



                    cassette. B: In                                         



                    formalin, labeled                                         



                    "biopsy of                                         



                    gastroesophageal                                         



                    junction", the                                         



                    specimen consists                                         



                    of a 0.1 x 0.1 x                                         



                    0.1cm white pink                                         



                    soft tissue which                                         



                    is submitted in                                         



                    toto in one                                         



                    cassette.MV/stPage                                         



                    2 of 2                                            











                    ID                  Date                Data Source

 

                    [id number]      10/27/2020 02:43:52 AM EDT [facility]

alth System











          Name      Value     Range     Interpretation Description Data      Sup

porting



                                        Code                Source(s) Document(s

)

 

          Sodium    141                 Normal (applies Sodium, Serum [facility]

 



          [Moles/volume mmol/L              to non-numeric           [facility]

em 



          ] in Serum or                     results)                      



          Plasma                                                      

 

          Urea nitrogen 14 mg/dl            Normal (applies Blood Urea [facility]

e 



          [Mass/volume]                     to non-numeric Nitrogen, [facility]

em 



          in Serum or                     results)  Serum               



          Plasma                                                      

 

          Carbon    25.2                Normal (applies CO2, Serum [facility] 



          dioxide,  mmol/L              to non-numeric           [facility] 



          total                         results)                      



          [Moles/volume                                                   



          ] in Serum or                                                   



          Plasma                                                      

 

          Glucose   101                 Normal (applies Glucose, Serum [facility]

e 



          [Mass/volume] mg/dL               to non-numeric           [facility]

em 



          in Serum or                     results)                      



          Plasma                                                      

 

          Chloride  104                 Normal (applies Chloride, [facility] 



          [Moles/volume mmol/L              to non-numeric Serum     [facility]

em 



          ] in Serum or                     results)                      



          Plasma                                                      

 

          Potassium 4.0                 Normal (applies Potassium, [facility] 



          [Mass/volume] mmol/L              to non-numeric Serum     [facility]

em 



          in Serum or                     results)                      



          Plasma                                                      

 

          Anion gap in 11.80               Normal (applies Anion Gap [facility] 



          Serum or  mmol/L              to non-numeric           [facility] System 



          Plasma                        results)                      

 

          Calcium   9.8                 Normal (applies Calcium, Total [facility]

e 



          [Mass/volume] mg/dl               to non-numeric Serum     [facility]

em 



          in Serum or                     results)                      



          Plasma                                                      

 

          Creatinine 0.85                Normal (applies Creatinine, [facility] 



          [Mass/volume] mg/dl               to non-numeric Serum     [facility]

em 



          in Serum or                     results)                      



          Plasma                                                      











                    ID                  Date                Data Source

 

                    [id number]      10/27/2020 02:43:52 AM EDT [facility]

[facility]











          Name      Value     Range     Interpretation Description Data      Sup

porting



                                        Code                Source(s) Document(s

)

 

          Leukocytes 10.1                Normal (applies WBC Count [facility] 



          [#/volume] in {10^3_uL            to non-numeric           Health    



          Unspecified }                   results)            System    



          specimen by                                                   



          Automated count                                                   

 

          Erythrocytes 4.48                Normal (applies RBC Count [facility] 



          [#/volume] in {10^6_uL            to non-numeric           Health    



          Blood by  }                   results)            System    



          Automated count                                                   

 

          Hemoglobin 13.4                Normal (applies Hemoglobin [facility] 



          [Mass/volume] in {gm/dL}             to non-numeric           Health  

  



          Blood                         results)            System    

 

          Erythrocyte mean 91.7 fl             Normal (applies MCV       [facility]

[facility] 



          corpuscular                     to non-numeric           [facility]    



          volume [Entitic                     results)            System    



          volume] by                                                   



          Automated count                                                   

 

          Erythrocyte mean 29.9 pg             Normal (applies MCH       [facility]

[facility] 



          corpuscular                     to non-numeric           [facility]    



          hemoglobin                     results)            System    



          [Entitic mass]                                                   



          by Automated                                                   



          count                                                       

 

          Erythrocyte mean 32.6                Normal (applies MCHC      [facility]

[facility] 



          corpuscular {gm/dL}             to non-numeric           Health    



          hemoglobin                     results)            System    



          concentration                                                   



          [Mass/volume] by                                                   



          Automated count                                                   

 

          Hematocrit 41.1 %              Normal (applies Hematocrit [facility] 



          [Volume                       to non-numeric           Health    



          Fraction] of                     results)            System    



          Blood                                                       

 

          Nucleated 0.0                 Normal (applies NRBC %    [facility] 



          erythrocytes {/100_WB            to non-numeric           Health    



          [#/volume] in C}                  results)            System    



          Body fluid                                                   

 

          Platelets 253                 Normal (applies Platelet Count [facility]

e 



          [#/volume] in {10^3_uL            to non-numeric           Health    



          Plasma by }                   results)            System    



          Automated count                                                   

 

          Erythrocyte 13.4 %              Normal (applies RDW-CV    [facility] 



          distribution                     to non-numeric           Health    



          width [Entitic                     results)            System    



          volume] by                                                   



          Automated count                                                   

 

          Platelet mean 9.7 fl              Normal (applies MPV       [facility]

 



          volume [Entitic                     to non-numeric           Health   

 



          volume] in Blood                     results)            System    



          by Automated                                                   



          count                                                       

 

          NRBC#     0.00                Normal (applies NRBC #    [facility] 



                    {10^3_uL            to non-numeric           Health    



                    }                   results)            System    

 

          Neutrophils/100 65.5 %              Normal (applies Neutrophil % [facility]

tawny 



          leukocytes in                     to non-numeric           Health    



          Blood by                      results)            System    



          Automated count                                                   

 

          Lymphocytes 26.6 %              Normal (applies Lymphocyte % [facility]

e 



          [#/volume] in                     to non-numeric           Health    



          Blood by                      results)            System    



          Automated count                                                   

 

          Neutrophils 6.6                 Normal (applies Neutrophil # [facility]

e 



          [#/volume] in {10^3_uL            to non-numeric           Health    



          Body fluid }                   results)            System    

 

          Monocytes 0.6                 Normal (applies Monocyte # [facility] 



          [#/volume] in {10^3_uL            to non-numeric           Health    



          Blood by Manual }                   results)            System    



          count                                                       

 

          Lymphocyte 2.7                 Normal (applies Lymphocyte # [facility]

 



          percent   {10^3_uL            to non-numeric           Health    



          differential }                   results)            System    



          count                                                       



          (procedure)                                                   

 

          Eosinophils/100 1.7 %               Normal (applies Eosinophil % [facility]

tawny 



          leukocytes in                     to non-numeric           Health    



          Unspecified                     results)            System    



          specimen                                                    

 

          Monocytes/100 5.7 %               Normal (applies Monocyte % [facility]

e 



          leukocytes in                     to non-numeric           Health    



          Blood                         results)            System    

 

          ImmatureGranuloc 0.02                Normal (applies Immature  [facility]

[facility] 



          ytes#     {10^3_uL            to non-numeric Granulocytes # Health    



                    }                   results)            System    

 

          Eosinophils 0.17                Normal (applies Eosinophil # [facility]

e 



          [#/volume] in {10^3_uL            to non-numeric           Health    



          Blood     }                   results)            System    

 

          ImmatureGranuloc 0.2 %               Normal (applies Immature  [facility]

[facility] 



          ytes%                         to non-numeric Granulocytes % Health    



                                        results)            System    

 

          Basophils/100 0.3 %               Normal (applies Basophil % [facility]

e 



          leukocytes in                     to non-numeric           Health    



          Unspecified                     results)            System    



          specimen by                                                   



          Manual count                                                   

 

          Basophils 0.03                Normal (applies Basophil # [facility] 



          [#/volume] in {10^3_uL            to non-numeric           Health    



          Blood by  }                   results)            System    



          Automated count                                                   











                    ID                  Date                Data Source

 

                    [id number]           10/21/2020 10:10:00 AM EDT [facility]









                                        MR abdomen with and without gadoliniumCL

INICAL INFORMATION:    NODULE;TECHNIQUE:



                                        Axial and coronal single shot fast spin-

echoT2-weighted images were obtained.   

Axial



                                        fat saturatedT2-weighted images wereobta

ined at intermediate and prolongedecho 

times.



                                         Axial in phase and out of phase T1-weig

htedgradient echoimages were obtained.



                                        Thick slab MRCP imageswere acquired in m

ultiple projections.   

Axialdiffusionweighted



                                        images were acquired.  Dynamic T1-weight

ed gradientecho acquisition was obtained



                                        precedingand following theintravenous in

jection of gadolinium at multiple time 

points



                                        ;these included axial hepaticarterial ph

ase, axial sagittal andcoronal hepatic 

venous



                                        phase and axial delayed phase images.COM

PARISON:  2020 available for



                                        review.FINDINGS:The liver demonstrates h

omogeneous a 1 cm right lobecyst.  There

 is



                                        no abnormal hepatic arterial phase enhan

cement. Hepatic size and contours are



                                        maintained.Hepatic and portalveins are p

atent and not displaced.  No 

intrahepatic



                                        ductaldilatation is found.   Thegallblad

rubin is  surgically absent. The common 

bile



                                        duct is not dilated.  The pancreas is in

tactwithout ductaldilatation, abnormal



                                        enhancement or focal lesion.  The spleen

 is normal in size.The adrenal glands



                                        appearunremarkable.  The kidneys demonst

ratesymmetric nephrograms.   No 

suspicious



                                        renal mass is seen.Nohydronephrosis is p

resent.  There is no 

perinephricinfiltration.



                                         There is a 1 cm exophytic cyst in the l

eftupperpole.No enlarged lymph nodes are



                                        found.  No ascites is present.  Theosseo

us structures appearintact.  Limited



                                        evaluation of theremaining soft tissues 

is unremarkable.  Small



                                        hiatushernianoted.IMPRESSION:   Hepatic 

and left renal cysts.Electronically



                                        Signed:Maria E,2020/10/21 at 14:24

 EDTTel [phone], Service support



                                        [phone]











          Name      Value     Range     Interpretation Code Description Data [facility]

rce(s) Supporting



                                                                      Document(s

)

 

                                                                      











                    ID                  Date                Data Source

 

                    [id number]           2020 11:34:00 AM EDT [facility]









                                        **We are attempting to reach an attendin

g provider to discussfindings. An 

addendum



                                        with communication details will be sentw

hen the communication is complete. 

**HISTORY:



                                        DIVERTICULITIS/LOWER QUADRANTPAIN;EXAMIN

ATION: CT Abdomen And Pelvis W/ Contrast



                                        InjectionTECHNIQUE:Helically acquiredima

ges were obtained of the abdomen 

andpelvis



                                        following IV contrast. A radiation dose 

optimizationtechniquewas used for this



                                        scan.IV Contrast dosage and agent: Oral 

T IV GASTROGRAFIN 30



                                        [id number]:



                                        None.COMPARISON:None____________________

________________________FINDINGS:LOWER 

CHEST:



                                        Lung bases are clear. Nocardiomegaly orp

ericardial effusion.LIVER: Homogeneous. 

No



                                        focal mass. There is a 1 hypodense nodul

einthe right lobe of the liver also 

continue



                                        is attenuation of65 HU. Solid. Incomplet

ely characterized. MRI isadvised. 

Thereis



                                        also a small cyst in the left lobe of th

e liver posteriorlyon axial image 11 of



                                        1.3cm.GALLBLADDER AND BILIARY TREE: The 

gallbladder was removed. Nointra- or



                                        extrahepatic biliary ductaldilation.PANC

REAS: No focal cystic or solid 

mass.SPLEEN:



                                        Normal size without focal cystic or kenney

dmass.ADRENAL GLANDS: No nodules.KIDNEYS

 AND



                                        URETERS: Normal renal size and position.

 Nohydronephrosis.There is a 1 cm cyst 

in the



                                        upper pole of theleft kidney.PERITONEUM:

 No ascites or free air. No otherfluid



                                        collection.BOWEL: The appendix is dilate

d to 1 cm in width. There is 

noperinephric



                                        stranding. Findingher to be correlated c

linicallyfor the possibility of early



                                        appendicitis. The stomach and smallbowel

 are notdilated. There are no signs of



                                        obstruction.Diverticulosis of the large 

bowel is noted. There is



                                        noinflammatorychanges appreciated. Moder

ate constipation noted.LYMPH NODES: No



                                        enlarged mesenteric or retroperitoneally

mphnodes.VESSELS: Aorta is



                                        non-dilated.URINARY BLADDER: Unremarkabl

e.REPRODUCTIVE ORGANS: Nopelvic masses. 

The



                                        uterus was removedABDOMINAL WALL: No dis

crete abdominal or pelvic wall



                                        hernia.BONES:No lytic or blastic abnorma

lity.IMPRESSION:The appendix is enlarged

 to 1



                                        cm in width. There arenoinflammatory saloni

nges surrounding the appendix. Findings 

could



                                        berelated to early appendicitis. Clinica

lcorrelation with site ofpain.Small 

solid



                                        nodule in the right lobe of the liver on

 axial image24.Incompletely 

characterized



                                        with this evaluation. Suggestfollow-up w

ith nonemergent MRI.Small cysts in 

theright



                                        lobe of the liver and the left kidney.Hi

atal hernia.Diverticulosis. No



                                        inflammation.Cholecystectomyand hysterec

niesha.Individualized dose optimization



                                        techniques were used for thisCT.** Elect

ronicallySigned by Chela Ford MD

 ****



                                        on 2020 at 1216 **Reported and sig

arun by: Chela Ford MDEnvision



                                        Physician ServicesSHCR DR CHELA YATES

[phone]:Chela Crystal MD at 12:15 EDTTel



                                        [phone], Service [phone]

-[id number], [phone]***START OF



                                        ADDENDUM***ADDENDUM REPORT:There is a7.7

 x 3.4 x 10.3 cm fatty lesion in the 

left



                                        gluteusmedius. 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

arge intramuscularlipoma.There is a 

linear



                                        septation. If needed. MRI will be helpfu

l forcomplete 

assessment.**Electronically



                                        Signed by Chela Ford MD **** on 

2020 at 1227 **Reported and signed

 by:



                                        [facility] [place] [facility]PREVIOUS R

EPORT:HISTORY: DIVERTICULITIS/LOWER



                                        QUADRANTPAIN;EXAMINATION: CT Abdomen And

 Pelvis W/ Contrast



                                        InjectionTECHNIQUE:Helically acquiredima

ges were obtained of the abdomen 

andpelvis



                                        following IV contrast. A radiation dose 

optimizationtechniquewas used for this



                                        scan.IV Contrast dosage and agent: Oral 

T IV GASTROGRAFIN 30



                                        [id number]:



                                        None.COMPARISON:None____________________

________________________FINDINGS:LOWER 

CHEST:



                                        Lung bases are clear. Nocardiomegaly orp

ericardial effusion.LIVER: Homogeneous. 

No



                                        focal mass. There is a 1 hypodense nodul

einthe right lobe of the liver also 

continue



                                        is attenuation of65 HU. Solid. Incomplet

ely characterized. MRI isadvised. 

Thereis



                                        also a small cyst in the left lobe of th

e liver posteriorlyon axial image 11 of



                                        1.3cm.GALLBLADDER AND BILIARY TREE: The 

gallbladder was removed. Nointra- or



                                        extrahepatic biliary ductaldilation.PANC

REAS: No focal cystic or solid 

mass.SPLEEN:



                                        Normal size without focal cystic or kenney

dmass.ADRENAL GLANDS: No nodules.KIDNEYS

 AND



                                        URETERS: Normal renal size and position.

 Nohydronephrosis.There is a 1 cm cyst 

in the



                                        upper pole of theleft kidney.PERITONEUM:

 No ascites or free air. No otherfluid



                                        collection.BOWEL: The appendix is dilate

d to 1 cm in width. There is 

noperinephric



                                        stranding. Findingher to be correlated c

linicallyfor the possibility of early



                                        appendicitis. The stomach and smallbowel

 are notdilated. There are no signs of



                                        obstruction.Diverticulosis of the large 

bowel is noted. There is



                                        noinflammatorychanges appreciated. Moder

ate constipation noted.LYMPH NODES: No



                                        enlarged mesenteric or retroperitoneally

mphnodes.VESSELS: Aorta is



                                        non-dilated.URINARY BLADDER: Unremarkabl

e.REPRODUCTIVE ORGANS: Nopelvic masses. 

The



                                        uterus was removedABDOMINAL WALL: No dis

crete abdominal or pelvic wall



                                        hernia.BONES:No lytic or blastic abnorma

lity.IMPRESSION:The appendix is enlarged

 to 1



                                        cm in width. There arenoinflammatory saloni

nges surrounding the appendix. Findings 

could



                                        berelated to early appendicitis. Clinica

lcorrelation with site ofpain.Small 

solid



                                        nodule in the right lobe of the liver on

 axial image24.Incompletely 

characterized



                                        with this evaluation. Suggestfollow-up w

ith nonemergent MRI.Small cysts in 

theright



                                        lobe of the liver and the left kidney.Hi

atal hernia.Diverticulosis. No



                                        inflammation.Cholecystectomyand hysterec

niesha.Individualized dose optimization



                                        techniques were used for thisCT.** Elect

ronicallySigned by Chela Ford MD

 ****



                                        on 2020 at 1216 **Reported and sig

arun by: Chela Ford MDEnvision



                                        Physician ServicesSHCR DR CHELA YATES

[phone]:Chela Crystal MD2020/ at 12:26 EDTTel



                                        [phone], Service [phone]

-[phone], [phone]***START OF



                                        ADDENDUM***ADDENDUM REPORT:There is a7.7

 x 3.4 x 10.3 cm fatty lesion in the 

left



                                        gluteusmedius. 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

arge intramuscularlipoma.There is a 

linear



                                        septation. If needed. MRI will be helpfu

l forcomplete 

assessment.**Electronically



                                        Signed by Chela Ford MD **** on 

2020 at 1227 **Reported and signed

 by:



                                        Mallika, MDSDiscoveroom P.C..PREVIOUS R

EPORT:HISTORY: DIVERTICULITIS/LOWER



                                        QUADRANTPAIN;EXAMINATION: CT Abdomen And

 Pelvis W/ Contrast



                                        InjectionTECHNIQUE:Helically acquiredima

ges were obtained of the abdomen 

andpelvis



                                        following IV contrast. A radiation dose 

optimizationtechniquewas used for this



                                        scan.IV Contrast dosage and agent: Oral 

T IV GASTROGRAFIN 30



                                        [id number]:



                                        None.COMPARISON:None____________________

________________________FINDINGS:LOWER 

CHEST:



                                        Lung bases are clear. Nocardiomegaly orp

ericardial effusion.LIVER: Homogeneous. 

No



                                        focal mass. There is a 1 hypodense nodul

einthe right lobe of the liver also 

continue



                                        is attenuation of65 HU. Solid. Incomplet

ely characterized. MRI isadvised. 

Thereis



                                        also a small cyst in the left lobe of th

e liver posteriorlyon axial image 11 of



                                        1.3cm.GALLBLADDER AND BILIARY TREE: The 

gallbladder was removed. Nointra- or



                                        extrahepatic biliary ductaldilation.PANC

REAS: No focal cystic or solid 

mass.SPLEEN:



                                        Normal size without focal cystic or kenney

dmass.ADRENAL GLANDS: No nodules.KIDNEYS

 AND



                                        URETERS: Normal renal size and position.

 Nohydronephrosis.There is a 1 cm cyst 

in the



                                        upper pole of theleft kidney.PERITONEUM:

 No ascites or free air. No otherfluid



                                        collection.BOWEL: The appendix is dilate

d to 1 cm in width. There is 

noperinephric



                                        stranding. Findingher to be correlated c

linicallyfor the possibility of early



                                        appendicitis. The stomach and smallbowel

 are notdilated. There are no signs of



                                        obstruction.Diverticulosis of the large 

bowel is noted. There is



                                        noinflammatorychanges appreciated. Moder

ate constipation noted.LYMPH NODES: No



                                        enlarged mesenteric or retroperitoneally

mphnodes.VESSELS: Aorta is



                                        non-dilated.URINARY BLADDER: Unremarkabl

e.REPRODUCTIVE ORGANS: Nopelvic masses. 

The



                                        uterus was removedABDOMINAL WALL: No dis

crete abdominal or pelvic wall



                                        hernia.BONES:No lytic or blastic abnorma

lity.IMPRESSION:The appendix is enlarged

 to 1



                                        cm in width. There arenoinflammatory saloni

nges surrounding the appendix. Findings 

could



                                        berelated to early appendicitis. Clinica

lcorrelation with site ofpain.Small 

solid



                                        nodule in the right lobe of the liver on

 axial image24.Incompletely 

characterized



                                        with this evaluation. Suggestfollow-up w

ith nonemergent MRI.Small cysts in 

theright



                                        lobe of the liver and the left kidney.Hi

atal hernia.Diverticulosis. No



                                        inflammation.Cholecystectomyand hysterec

niesha.Individualized dose optimization



                                        techniques were used for thisCT.** Elect

ronicallySigned by Chela Ford MD

 ****



                                        on 2020 at 1216 **Reported and sig

arun by: Chela Ford MDEnvision



                                        Physician ServicesSHCR DR CHELA YATES

[facility]([phone]) [phone]:Chela Crystal MD at 12:26 EDTTel



                                        [phone], Service [phone]

[id number], [id number]. : The above



                                        information has been verbally conveyed tl Adams.James Crystal MD to DESMOND Parmar,



                                        on 2020 12:36:34(ET).











          Name      Value     Range     Interpretation Code Description Data Tereza

rce(s) Supporting



                                                                      Document(s

)

 

                                                                      











                    ID                  Date                Data Source

 

                    [id number]          2020 08:40:00 AM EDT [facility]











          Name      Value     Range     Interpretation Code Description Data Tereza

rce(s) Supporting



                                                                      Document(s

)

 

          COVID-19..                                         [facility]  









                                        This lab was ordered by [facility] and reported by [facility]

 Denzel Rick.









                                        Procedure

 

                                        







Vital Signs







                    ID                  Date                Data Source

 

                    UNK                                     











           Name       Value      Range      Interpretation Code Description Data

 Source(s)

 

           Diastolic blood 67 mm[Hg]  0 - 999    Normal (applies to 67 mm[Hg]  [facility]

[facility]



           pressure                         non-numeric results)            [facility]

 

           Systolic blood 140 mm[Hg] 0 - 999    Above high normal 140 mm[Hg] Mon

[facility]



           pressure                                               [facility]

 

           Oxygen saturation 98 %       0 - 999    Normal (applies to 98 %      

 [facility]



           in Arterial blood                       non-numeric results)         

   [facility]



           by Pulse oximetry                                             

 

           Respiratory rate 17         0 - 999    Normal (applies to 17         

[facility]



                                            non-numeric results)            [facility]

 

           Heart rate 69         0 - 999    Normal (applies to 69         [facility]

[facility]



                                            non-numeric results)            [facility]

 

           Body temperature 97.1 [degF] 0 - 200    Normal (applies to 97.1 [degF

] [facility]



                                            non-numeric results)            [facility]

 

           Body temperature 36.1 Janice   0 - 99.9   Below low normal 36.1 Janice   [facility]

[facility]



                                                                  [facility]

 

           Body surface area 5.3 m2                           5.3 m2     [facility]

ore



           Derived from                                             [facility]

m



           formula                                                

 

           Body weight 950.72 kg                        950.72 kg  [facility]

 

           Body height 165.1 cm                         165.1 cm   [facility]

 

           Body mass index 34.7 kg/m2                       34.7 kg/m2 [facility]

e



           (BMI) [Ratio]                                             [facility]

em

## 2020-11-02 NOTE — EKG
Test Reason : 

Blood Pressure : ***/*** mmHG

Vent. Rate : 101 BPM     Atrial Rate : 101 BPM

   P-R Int : 156 ms          QRS Dur : 088 ms

    QT Int : 358 ms       P-R-T Axes : 032 -40 039 degrees

   QTc Int : 464 ms

 

SINUS TACHYCARDIA

LEFT AXIS DEVIATION

ABNORMAL ECG

WHEN COMPARED WITH ECG OF 19-DEC-2019 09:53,

NO SIGNIFICANT CHANGE WAS FOUND

Confirmed by PRASANNA MORLEY MD ([id number]) on 11/2/2020 3:15:42 PM

 

Referred By:             Confirmed By:PRASANNA MORLEY MD

## 2020-11-02 NOTE — PDOC
History of Present Illness





- General


Chief Complaint: Pain


Stated Complaint: ABD PAIN


Time Seen by Provider: 11/02/20 09:09





- History of Present Illness


Initial Comments: 





11/02/20 09:58


76 yo female with pmh diverticulitis, acid reflux, htn, hld present to the ED 

for right flank pain of one day. PT explains she has not had similar pain 

before. Pain is not similar to prior diverticulitis because this is radiating to

her back and usually its on bilateral flanks. Pt explains pain is sharp better 

with defectation and is 7/10 in pain. Pt explains she has associated nausea and 

urinary frequency. Pt has had previous UTI diagnosed on 10/23 where she took 500

cephalexin for five days. Pt also has been having bilateral lower quadrants pain

since september from her diverticulitis. She has had a MRA with and without 

contrast showing hepatic and left renal cyst on 10/16. Pt has been constipated 

since Thursday but yesterday had 5-6 bowel movements of small yellowish "turds".

Pt denies fevers, chills, sob, chest pain, weakness, dysuria. 





PMH: diverticulitis, acid reflux, htn, hld


Meds: esmoprazole, famotidine, 


PSH: Cholecystectomy, total hysterectomy


Allergies: NKDA


Social: denies smoking drinking or drugs








Past History





- Medical History


Allergies/Adverse Reactions: 


                                    Allergies











Allergy/AdvReac Type Severity Reaction Status Date / Time


 


No Known Allergies Allergy   Verified 11/02/20 08:58











Home Medications: 


Ambulatory Orders





Aspirin [Ecotrin] 81 mg PO DAILY 07/23/19 


Docusate Sodium [Colace] 100 mg PO DAILY 07/23/19 


Esomeprazole Magnesium 20 mg PO DAILY 07/23/19 


Magnesium 200 mg PO DAILY 07/23/19 








Anemia: No


Asthma: No


Cancer: No


Cardiac Disorders: No


CVA: No


COPD: No


CHF: No


Dementia: No


Diabetes: No


GI Disorders: Yes (DIVERTICULOSIS,ACID REFLUX)


 Disorders: No


HTN: Yes


Hypercholesterolemia: Yes


Liver Disease: No


Seizures: No


Thyroid Disease: No





- Surgical History


Abdominal Surgery: No


Appendectomy: No


Cardiac Surgery: No


Cholecystectomy: Yes


Lung Surgery: No


Neurologic Surgery: No


Orthopedic Surgery: Yes (RT KNEE ARTHROSCOPY)





- Psycho-Social/Smoking History


Smoking Status: No


Smoking History: Never smoked


Have you smoked in the past 12 months: No


Number of Cigarettes Smoked Daily: 0





- Substance Abuse Hx (Audit-C & DAST Scrn)


How often the patient has a drink containing alcohol: Never


Score: In Men: 4 or > Positive; In Women: 3 or > Positive: 0


Screen Result (Pos requires Nsg. Audit-10AR): Negative


In the last yr the pt used illegal drug/Rx for NonMed reason: No


Score:  Yes response is considered Positive: 0


Screen Result (Positive result requires Nsg. DAST-10): Negative





**Review of Systems





- Review of Systems


Comments:: 





11/02/20 10:46


GENERAL/CONSTITUTIONAL: No fever or chills. No weakness.


HEAD, EYES, EARS, NOSE AND THROAT: No change in vision. No ear pain or 

discharge. No sore throat.


CARDIOVASCULAR: No chest pain or shortness of breath


RESPIRATORY: No cough, wheezing, or hemoptysis.


GASTROINTESTINAL: Nausea and diarrhea


GENITOURINARY: Urinary frequency. NO dysuria. 


MUSCULOSKELETAL: No joint or muscle swelling or pain. No neck or back pain.


SKIN: No rash


NEUROLOGIC: No headache, vertigo, loss of consciousness, or change in 

strength/sensation.


ALLERGIC/IMMUNOLOGIC: No hives or skin allergy.








*Physical Exam





- Vital Signs


                                Last Vital Signs











Temp Pulse Resp BP Pulse Ox


 


 98.2 F   116 H  20   150/97   100 


 


 11/02/20 08:58  11/02/20 08:58  11/02/20 08:58  11/02/20 08:58  11/02/20 08:58














- Physical Exam





11/02/20 10:49


GENERAL: Awake, alert, and fully oriented, in no acute distress


HEAD: No signs of trauma, normocephalic, atraumatic 


EYES: PERRLA, EOMI, sclera anicteric, conjunctiva clear


ENT: Auricles normal inspection, hearing grossly normal, nares patent, 

oropharynx clear without


exudates. Moist mucosa


NECK: Normal ROM, supple, no lymphadenopathy, JVD, or masses


LUNGS: No distress, speaks full sentences, clear to auscultation bilaterally    


HEART: Regular rate and rhythm, normal S1 and S2, no murmurs, rubs or gallops, 

peripheral pulses normal and equal bilaterally. 


ABDOMEN: mild tenderness to palpation on bilateral lower quadrants. Normal bowel

 sounds in all four quadrants. 


EXTREMITIES : Normal inspection, Normal range of motion, no edema.  No clubbing 

or cyanosis. 


NEUROLOGICAL: Cranial nerves II through XII grossly intact.  Normal speech


SKIN: Warm, Dry, normal turgor, no rashes or lesions note








**Heart Score/ECG Review





- ECG Impressions


Comment:: 





11/02/20 19:57


Sinus tachycardia at 101


Normal [place] QRS and QT interval


LAD


No signs of ischemia





ED Treatment Course





- LABORATORY


CBC & Chemistry Diagram: 


                                 11/02/20 09:59





                                 11/02/20 09:59





Medical Decision Making





- Medical Decision Making





11/02/20 10:50


76 yo female with right flank pain for one day with pmh of diverticuluts, htn, 

hld, cholecystectomy, total hysterectomy. 





Ddx: Pyelo, uti, kidney stone, diverticulitis, appendicitis, mesenteric 

ischemia, ischemic colitis





Plan: cbc, cmp, lactic acid, ua, uc, ct scan with contrast. 


11/02/20 13:38


Pt lactic acid was elevated will repeat after fluids


Pt after toradol and tylenol still having pain. 


Pt does not want opioids 


Pt explains if everything is normal wants to go home. Pt shared decision was 

made. Pt is ambulatory and feels slight improvement. Pt is okay with going home 

and following up with GI and PCP. Pt will come back if worsening pain, fevers or

 chills, sob or chest pain, unable to eat anything by mouth.  


Pt will repeat lactic and dc home if normal with GI follow up


11/02/20 13:39





11/02/20 13


Pt lactic acid came down to .8. Pt given CT scan results and pt explains she 

will follow up with GI and PCP. Pt DCed








Discharge





- Discharge Information


Problems reviewed: Yes


Clinical Impression/Diagnosis: 


 RLQ abdominal pain





Condition: Good


Disposition: HOME





- Follow up/Referral


Referrals: 


Patience Dorman MD [Primary Care Provider] - 





- Patient Discharge Instructions


Additional Instructions: 


You came to the ED for RLQ pain this is most likely due to diverticulosis or 

musculoskeletal pain. 





At the ED we did labs and imaging which were all benign. We gave you pain meds. 

For this pain follow up with your gastroenterologist within the next few days. 

Please follow up with your Primary care physician as well with this pain. 





For the pain continue taking ibuprofen 400mg every 6 hours or tylenol 325mg-

650mg every 6 hours for pain





For any of these please return:


- worsening abdominal pain


-fevers or chills


- SOB or chest pain


- unable to eat anything by mouth 


For any emergent conditions please call for medical help right away. 





- Post Discharge Activity

## 2021-04-23 ENCOUNTER — APPOINTMENT (OUTPATIENT)
Dept: OPHTHALMOLOGY | Facility: CLINIC | Age: 76
End: 2021-04-23
Payer: MEDICARE

## 2021-04-23 ENCOUNTER — NON-APPOINTMENT (OUTPATIENT)
Age: 76
End: 2021-04-23

## 2021-04-23 PROCEDURE — 92014 COMPRE OPH EXAM EST PT 1/>: CPT

## 2021-11-19 ENCOUNTER — APPOINTMENT (OUTPATIENT)
Dept: OPHTHALMOLOGY | Facility: CLINIC | Age: 76
End: 2021-11-19
Payer: MEDICARE

## 2021-11-19 ENCOUNTER — NON-APPOINTMENT (OUTPATIENT)
Age: 76
End: 2021-11-19

## 2021-11-19 PROCEDURE — 92014 COMPRE OPH EXAM EST PT 1/>: CPT

## 2022-05-27 ENCOUNTER — APPOINTMENT (OUTPATIENT)
Dept: OPHTHALMOLOGY | Facility: CLINIC | Age: 77
End: 2022-05-27
Payer: MEDICARE

## 2022-05-27 ENCOUNTER — NON-APPOINTMENT (OUTPATIENT)
Age: 77
End: 2022-05-27

## 2022-05-27 PROCEDURE — 92136 OPHTHALMIC BIOMETRY: CPT

## 2022-05-27 PROCEDURE — 92012 INTRM OPH EXAM EST PATIENT: CPT

## 2022-09-08 ENCOUNTER — HOSPITAL ENCOUNTER (EMERGENCY)
Dept: HOSPITAL 74 - JER | Age: 77
Discharge: HOME | End: 2022-09-08
Payer: COMMERCIAL

## 2022-09-08 VITALS
DIASTOLIC BLOOD PRESSURE: 84 MMHG | HEART RATE: 86 BPM | TEMPERATURE: 97.5 F | SYSTOLIC BLOOD PRESSURE: 127 MMHG | RESPIRATION RATE: 18 BRPM

## 2022-09-08 VITALS — BODY MASS INDEX: 32.8 KG/M2

## 2022-09-08 DIAGNOSIS — R10.9: Primary | ICD-10-CM

## 2022-09-08 LAB
ALBUMIN SERPL-MCNC: 4 G/DL (ref 3.4–5)
ALP SERPL-CCNC: 93 U/L (ref 45–117)
ALT SERPL-CCNC: 15 U/L (ref 13–61)
ANION GAP SERPL CALC-SCNC: 6 MMOL/L (ref 8–16)
APPEARANCE UR: CLEAR
AST SERPL-CCNC: 13 U/L (ref 15–37)
BASOPHILS # BLD: 0.4 % (ref 0–2)
BILIRUB SERPL-MCNC: 0.5 MG/DL (ref 0.2–1)
BILIRUB UR STRIP.AUTO-MCNC: NEGATIVE MG/DL
BNP SERPL-MCNC: 17.4 PG/ML (ref 5–450)
BUN SERPL-MCNC: 17 MG/DL (ref 7–18)
CALCIUM SERPL-MCNC: 9.7 MG/DL (ref 8.5–10.1)
CHLORIDE SERPL-SCNC: 108 MMOL/L (ref 98–107)
CO2 SERPL-SCNC: 27 MMOL/L (ref 21–32)
COLOR UR: YELLOW
CREAT SERPL-MCNC: 0.8 MG/DL (ref 0.55–1.3)
DEPRECATED RDW RBC AUTO: 13.5 % (ref 11.6–15.6)
EOSINOPHIL # BLD: 0.9 % (ref 0–4.5)
GLUCOSE SERPL-MCNC: 84 MG/DL (ref 74–106)
HCT VFR BLD CALC: 41.7 % (ref 32.4–45.2)
HGB BLD-MCNC: 13.9 GM/DL (ref 10.7–15.3)
KETONES UR QL STRIP: (no result)
LEUKOCYTE ESTERASE UR QL STRIP.AUTO: NEGATIVE
LIPASE SERPL-CCNC: 135 U/L (ref 73–393)
LYMPHOCYTES # BLD: 37.1 % (ref 8–40)
MAGNESIUM SERPL-MCNC: 2.3 MG/DL (ref 1.8–2.4)
MCH RBC QN AUTO: 30.2 PG (ref 25.7–33.7)
MCHC RBC AUTO-ENTMCNC: 33.4 G/DL (ref 32–36)
MCV RBC: 90.4 FL (ref 80–96)
MONOCYTES # BLD AUTO: 6.6 % (ref 3.8–10.2)
NEUTROPHILS # BLD: 55 % (ref 42.8–82.8)
NITRITE UR QL STRIP: NEGATIVE
PH UR: 5 [PH] (ref 5–8)
PLATELET # BLD AUTO: 258 10^3/UL (ref 134–434)
PMV BLD: 8.4 FL (ref 7.5–11.1)
PROT SERPL-MCNC: 7.8 G/DL (ref 6.4–8.2)
PROT UR QL STRIP: NEGATIVE
PROT UR QL STRIP: NEGATIVE
RBC # BLD AUTO: 4.61 M/MM3 (ref 3.6–5.2)
SODIUM SERPL-SCNC: 142 MMOL/L (ref 136–145)
SP GR UR: 1.03 (ref 1.01–1.03)
UROBILINOGEN UR STRIP-MCNC: 0.2 MG/DL (ref 0.2–1)
WBC # BLD AUTO: 8.2 K/MM3 (ref 4–10)

## 2022-09-08 PROCEDURE — U0005 INFEC AGEN DETEC AMPLI PROBE: HCPCS

## 2022-09-08 PROCEDURE — U0003 INFECTIOUS AGENT DETECTION BY NUCLEIC ACID (DNA OR RNA); SEVERE ACUTE RESPIRATORY SYNDROME CORONAVIRUS 2 (SARS-COV-2) (CORONAVIRUS DISEASE [COVID-19]), AMPLIFIED PROBE TECHNIQUE, MAKING USE OF HIGH THROUGHPUT TECHNOLOGIES AS DESCRIBED BY CMS-2020-01-R: HCPCS

## 2022-12-02 ENCOUNTER — APPOINTMENT (OUTPATIENT)
Dept: OPHTHALMOLOGY | Facility: CLINIC | Age: 77
End: 2022-12-02

## 2022-12-02 ENCOUNTER — NON-APPOINTMENT (OUTPATIENT)
Age: 77
End: 2022-12-02

## 2022-12-02 PROCEDURE — 92012 INTRM OPH EXAM EST PATIENT: CPT

## 2022-12-02 PROCEDURE — 92083 EXTENDED VISUAL FIELD XM: CPT

## 2022-12-02 PROCEDURE — 92133 CPTRZD OPH DX IMG PST SGM ON: CPT

## 2023-05-23 ENCOUNTER — HOSPITAL ENCOUNTER (OUTPATIENT)
Dept: HOSPITAL 74 - JER | Age: 78
Setting detail: OBSERVATION
LOS: 1 days | Discharge: HOME | End: 2023-05-24
Attending: INTERNAL MEDICINE | Admitting: INTERNAL MEDICINE
Payer: COMMERCIAL

## 2023-05-23 VITALS — BODY MASS INDEX: 33.3 KG/M2

## 2023-05-23 DIAGNOSIS — I10: ICD-10-CM

## 2023-05-23 DIAGNOSIS — R42: Primary | ICD-10-CM

## 2023-05-23 DIAGNOSIS — K21.9: ICD-10-CM

## 2023-05-23 DIAGNOSIS — K57.90: ICD-10-CM

## 2023-05-23 DIAGNOSIS — E78.5: ICD-10-CM

## 2023-05-23 DIAGNOSIS — K58.9: ICD-10-CM

## 2023-05-23 LAB
ALBUMIN SERPL-MCNC: 3.5 G/DL (ref 3.4–5)
ALP SERPL-CCNC: 69 U/L (ref 45–117)
ALT SERPL-CCNC: 19 U/L (ref 13–61)
ANION GAP SERPL CALC-SCNC: 4 MMOL/L (ref 8–16)
APPEARANCE UR: CLEAR
AST SERPL-CCNC: 11 U/L (ref 15–37)
BASOPHILS # BLD: 0.5 % (ref 0–2)
BILIRUB SERPL-MCNC: 0.4 MG/DL (ref 0.2–1)
BILIRUB UR STRIP.AUTO-MCNC: NEGATIVE MG/DL
BUN SERPL-MCNC: 10.9 MG/DL (ref 7–18)
CALCIUM SERPL-MCNC: 9.4 MG/DL (ref 8.5–10.1)
CHLORIDE SERPL-SCNC: 110 MMOL/L (ref 98–107)
CO2 SERPL-SCNC: 28 MMOL/L (ref 21–32)
COLOR UR: YELLOW
CREAT SERPL-MCNC: 0.8 MG/DL (ref 0.55–1.3)
DEPRECATED RDW RBC AUTO: 13.9 % (ref 11.6–15.6)
EOSINOPHIL # BLD: 0.7 % (ref 0–4.5)
GLUCOSE SERPL-MCNC: 95 MG/DL (ref 74–106)
HCT VFR BLD CALC: 37.5 % (ref 32.4–45.2)
HGB BLD-MCNC: 13 GM/DL (ref 10.7–15.3)
KETONES UR QL STRIP: NEGATIVE
LEUKOCYTE ESTERASE UR QL STRIP.AUTO: NEGATIVE
LYMPHOCYTES # BLD: 28.1 % (ref 8–40)
MAGNESIUM SERPL-MCNC: 2.1 MG/DL (ref 1.8–2.4)
MCH RBC QN AUTO: 30.7 PG (ref 25.7–33.7)
MCHC RBC AUTO-ENTMCNC: 34.5 G/DL (ref 32–36)
MCV RBC: 89.1 FL (ref 80–96)
MONOCYTES # BLD AUTO: 5.2 % (ref 3.8–10.2)
NEUTROPHILS # BLD: 65.5 % (ref 42.8–82.8)
NITRITE UR QL STRIP: NEGATIVE
PH UR: 5.5 [PH] (ref 5–8)
PLATELET # BLD AUTO: 215 10^3/UL (ref 134–434)
PMV BLD: 8.2 FL (ref 7.5–11.1)
POTASSIUM SERPLBLD-SCNC: 4.3 MMOL/L (ref 3.5–5.1)
PROT SERPL-MCNC: 6.9 G/DL (ref 6.4–8.2)
PROT UR QL STRIP: NEGATIVE
PROT UR QL STRIP: NEGATIVE
RBC # BLD AUTO: 4.21 M/MM3 (ref 3.6–5.2)
SODIUM SERPL-SCNC: 142 MMOL/L (ref 136–145)
SP GR UR: 1.01 (ref 1.01–1.03)
UROBILINOGEN UR STRIP-MCNC: 0.2 MG/DL (ref 0.2–1)
WBC # BLD AUTO: 6.9 K/MM3 (ref 4–10)

## 2023-05-23 PROCEDURE — G0378 HOSPITAL OBSERVATION PER HR: HCPCS

## 2023-05-23 PROCEDURE — 3E033GC INTRODUCTION OF OTHER THERAPEUTIC SUBSTANCE INTO PERIPHERAL VEIN, PERCUTANEOUS APPROACH: ICD-10-PCS | Performed by: INTERNAL MEDICINE

## 2023-05-23 PROCEDURE — 3E0337Z INTRODUCTION OF ELECTROLYTIC AND WATER BALANCE SUBSTANCE INTO PERIPHERAL VEIN, PERCUTANEOUS APPROACH: ICD-10-PCS | Performed by: INTERNAL MEDICINE

## 2023-05-23 PROCEDURE — 3E033NZ INTRODUCTION OF ANALGESICS, HYPNOTICS, SEDATIVES INTO PERIPHERAL VEIN, PERCUTANEOUS APPROACH: ICD-10-PCS | Performed by: INTERNAL MEDICINE

## 2023-05-23 RX ADMIN — SIMETHICONE CHEW TAB 80 MG PRN MG: 80 TABLET ORAL at 23:24

## 2023-05-24 VITALS
SYSTOLIC BLOOD PRESSURE: 120 MMHG | RESPIRATION RATE: 20 BRPM | DIASTOLIC BLOOD PRESSURE: 80 MMHG | HEART RATE: 76 BPM | TEMPERATURE: 98 F

## 2023-05-24 RX ADMIN — SIMETHICONE CHEW TAB 80 MG PRN MG: 80 TABLET ORAL at 10:48

## 2023-06-03 ENCOUNTER — HOSPITAL ENCOUNTER (INPATIENT)
Dept: HOSPITAL 74 - JER | Age: 78
LOS: 4 days | Discharge: HOME | DRG: 392 | End: 2023-06-07
Attending: INTERNAL MEDICINE | Admitting: INTERNAL MEDICINE
Payer: COMMERCIAL

## 2023-06-03 VITALS — BODY MASS INDEX: 32.9 KG/M2

## 2023-06-03 DIAGNOSIS — I10: ICD-10-CM

## 2023-06-03 DIAGNOSIS — K57.20: Primary | ICD-10-CM

## 2023-06-03 DIAGNOSIS — K58.9: ICD-10-CM

## 2023-06-03 DIAGNOSIS — K21.9: ICD-10-CM

## 2023-06-03 DIAGNOSIS — E78.5: ICD-10-CM

## 2023-06-03 LAB
ALBUMIN SERPL-MCNC: 3.6 G/DL (ref 3.4–5)
ALP SERPL-CCNC: 69 U/L (ref 45–117)
ALT SERPL-CCNC: 17 U/L (ref 13–61)
ANION GAP SERPL CALC-SCNC: 6 MMOL/L (ref 8–16)
APPEARANCE UR: CLEAR
AST SERPL-CCNC: 13 U/L (ref 15–37)
BACTERIA # UR AUTO: 189 /UL (ref 0–1359)
BASOPHILS # BLD: 0.2 % (ref 0–2)
BILIRUB SERPL-MCNC: 0.4 MG/DL (ref 0.2–1)
BILIRUB UR STRIP.AUTO-MCNC: NEGATIVE MG/DL
BUN SERPL-MCNC: 11.6 MG/DL (ref 7–18)
CALCIUM SERPL-MCNC: 9.4 MG/DL (ref 8.5–10.1)
CASTS URNS QL MICRO: 0 /UL (ref 0–3.1)
CHLORIDE SERPL-SCNC: 110 MMOL/L (ref 98–107)
CO2 SERPL-SCNC: 26 MMOL/L (ref 21–32)
COLOR UR: YELLOW
CREAT SERPL-MCNC: 0.8 MG/DL (ref 0.55–1.3)
DEPRECATED RDW RBC AUTO: 14.3 % (ref 11.6–15.6)
EOSINOPHIL # BLD: 0.2 % (ref 0–4.5)
EPITH CASTS URNS QL MICRO: >36 /UL (ref 0–25.1)
GLUCOSE SERPL-MCNC: 110 MG/DL (ref 74–106)
HCT VFR BLD CALC: 38.1 % (ref 32.4–45.2)
HGB BLD-MCNC: 13.1 GM/DL (ref 10.7–15.3)
KETONES UR QL STRIP: NEGATIVE
LEUKOCYTE ESTERASE UR QL STRIP.AUTO: (no result)
LYMPHOCYTES # BLD: 18.5 % (ref 8–40)
MCH RBC QN AUTO: 30.8 PG (ref 25.7–33.7)
MCHC RBC AUTO-ENTMCNC: 34.5 G/DL (ref 32–36)
MCV RBC: 89.2 FL (ref 80–96)
MONOCYTES # BLD AUTO: 4.9 % (ref 3.8–10.2)
NEUTROPHILS # BLD: 76.2 % (ref 42.8–82.8)
NITRITE UR QL STRIP: NEGATIVE
PH UR: 5 [PH] (ref 5–8)
PLATELET # BLD AUTO: 242 10^3/UL (ref 134–434)
PMV BLD: 8.3 FL (ref 7.5–11.1)
POTASSIUM SERPLBLD-SCNC: 4.4 MMOL/L (ref 3.5–5.1)
PROT SERPL-MCNC: 7.1 G/DL (ref 6.4–8.2)
PROT UR QL STRIP: NEGATIVE
PROT UR QL STRIP: NEGATIVE
RBC # BLD AUTO: 15 /UL (ref 0–23.9)
RBC # BLD AUTO: 4.27 M/MM3 (ref 3.6–5.2)
SODIUM SERPL-SCNC: 141 MMOL/L (ref 136–145)
SP GR UR: 1.02 (ref 1.01–1.03)
UROBILINOGEN UR STRIP-MCNC: 0.2 MG/DL (ref 0.2–1)
WBC # BLD AUTO: 10.2 K/MM3 (ref 4–10)
WBC # UR AUTO: 23 /UL (ref 0–25.8)

## 2023-06-03 RX ADMIN — DEXTROSE AND SODIUM CHLORIDE SCH MLS/HR: 5; 450 INJECTION, SOLUTION INTRAVENOUS at 12:20

## 2023-06-03 RX ADMIN — PIPERACILLIN AND TAZOBACTAM SCH MLS/HR: 4; .5 INJECTION, POWDER, LYOPHILIZED, FOR SOLUTION INTRAVENOUS at 17:58

## 2023-06-04 LAB
ALBUMIN SERPL-MCNC: 3.4 G/DL (ref 3.4–5)
ALP SERPL-CCNC: 70 U/L (ref 45–117)
ALT SERPL-CCNC: 14 U/L (ref 13–61)
ANION GAP SERPL CALC-SCNC: 4 MMOL/L (ref 8–16)
AST SERPL-CCNC: 9 U/L (ref 15–37)
BASOPHILS # BLD: 0.3 % (ref 0–2)
BILIRUB SERPL-MCNC: 0.5 MG/DL (ref 0.2–1)
BUN SERPL-MCNC: 7.9 MG/DL (ref 7–18)
CALCIUM SERPL-MCNC: 9.1 MG/DL (ref 8.5–10.1)
CHLORIDE SERPL-SCNC: 109 MMOL/L (ref 98–107)
CO2 SERPL-SCNC: 29 MMOL/L (ref 21–32)
CREAT SERPL-MCNC: 0.7 MG/DL (ref 0.55–1.3)
DEPRECATED RDW RBC AUTO: 14.2 % (ref 11.6–15.6)
EOSINOPHIL # BLD: 1.1 % (ref 0–4.5)
GLUCOSE SERPL-MCNC: 100 MG/DL (ref 74–106)
HCT VFR BLD CALC: 37.4 % (ref 32.4–45.2)
HGB BLD-MCNC: 12.8 GM/DL (ref 10.7–15.3)
LYMPHOCYTES # BLD: 30.7 % (ref 8–40)
MCH RBC QN AUTO: 30.6 PG (ref 25.7–33.7)
MCHC RBC AUTO-ENTMCNC: 34.2 G/DL (ref 32–36)
MCV RBC: 89.4 FL (ref 80–96)
MONOCYTES # BLD AUTO: 5.8 % (ref 3.8–10.2)
NEUTROPHILS # BLD: 62.1 % (ref 42.8–82.8)
PLATELET # BLD AUTO: 261 10^3/UL (ref 134–434)
PMV BLD: 7.8 FL (ref 7.5–11.1)
POTASSIUM SERPLBLD-SCNC: 3.9 MMOL/L (ref 3.5–5.1)
PROT SERPL-MCNC: 6.8 G/DL (ref 6.4–8.2)
RBC # BLD AUTO: 4.18 M/MM3 (ref 3.6–5.2)
SODIUM SERPL-SCNC: 141 MMOL/L (ref 136–145)
WBC # BLD AUTO: 6 K/MM3 (ref 4–10)

## 2023-06-04 RX ADMIN — PIPERACILLIN AND TAZOBACTAM SCH MLS/HR: 4; .5 INJECTION, POWDER, LYOPHILIZED, FOR SOLUTION INTRAVENOUS at 10:01

## 2023-06-04 RX ADMIN — ENOXAPARIN SODIUM SCH MG: 40 INJECTION SUBCUTANEOUS at 10:01

## 2023-06-04 RX ADMIN — DEXTROSE AND SODIUM CHLORIDE SCH MLS/HR: 5; 450 INJECTION, SOLUTION INTRAVENOUS at 10:30

## 2023-06-04 RX ADMIN — PIPERACILLIN AND TAZOBACTAM SCH MLS/HR: 4; .5 INJECTION, POWDER, LYOPHILIZED, FOR SOLUTION INTRAVENOUS at 01:51

## 2023-06-04 RX ADMIN — PIPERACILLIN AND TAZOBACTAM SCH MLS/HR: 4; .5 INJECTION, POWDER, LYOPHILIZED, FOR SOLUTION INTRAVENOUS at 18:55

## 2023-06-05 LAB
ALBUMIN SERPL-MCNC: 3.5 G/DL (ref 3.4–5)
ALP SERPL-CCNC: 64 U/L (ref 45–117)
ALT SERPL-CCNC: 16 U/L (ref 13–61)
ANION GAP SERPL CALC-SCNC: 9 MMOL/L (ref 8–16)
APTT BLD: 30.6 SECONDS (ref 25.2–36.5)
AST SERPL-CCNC: 13 U/L (ref 15–37)
BASOPHILS # BLD: 0.3 % (ref 0–2)
BILIRUB SERPL-MCNC: 0.7 MG/DL (ref 0.2–1)
BUN SERPL-MCNC: 8.1 MG/DL (ref 7–18)
CALCIUM SERPL-MCNC: 8.9 MG/DL (ref 8.5–10.1)
CHLORIDE SERPL-SCNC: 107 MMOL/L (ref 98–107)
CO2 SERPL-SCNC: 26 MMOL/L (ref 21–32)
CREAT SERPL-MCNC: 0.9 MG/DL (ref 0.55–1.3)
DEPRECATED RDW RBC AUTO: 14.1 % (ref 11.6–15.6)
EOSINOPHIL # BLD: 1.1 % (ref 0–4.5)
GLUCOSE SERPL-MCNC: 163 MG/DL (ref 74–106)
HCT VFR BLD CALC: 38.1 % (ref 32.4–45.2)
HGB BLD-MCNC: 13.1 GM/DL (ref 10.7–15.3)
INR BLD: 1.24 (ref 0.83–1.09)
LYMPHOCYTES # BLD: 28.5 % (ref 8–40)
MAGNESIUM SERPL-MCNC: 2.1 MG/DL (ref 1.8–2.4)
MCH RBC QN AUTO: 30.4 PG (ref 25.7–33.7)
MCHC RBC AUTO-ENTMCNC: 34.5 G/DL (ref 32–36)
MCV RBC: 88.2 FL (ref 80–96)
MONOCYTES # BLD AUTO: 4.4 % (ref 3.8–10.2)
NEUTROPHILS # BLD: 65.7 % (ref 42.8–82.8)
PHOSPHATE SERPL-MCNC: 3.2 MG/DL (ref 2.5–4.9)
PLATELET # BLD AUTO: 269 10^3/UL (ref 134–434)
PMV BLD: 7.7 FL (ref 7.5–11.1)
POTASSIUM SERPLBLD-SCNC: 3.5 MMOL/L (ref 3.5–5.1)
PROT SERPL-MCNC: 7.2 G/DL (ref 6.4–8.2)
PT PNL PPP: 14.4 SEC (ref 9.7–13)
RBC # BLD AUTO: 4.31 M/MM3 (ref 3.6–5.2)
SODIUM SERPL-SCNC: 142 MMOL/L (ref 136–145)
WBC # BLD AUTO: 6.1 K/MM3 (ref 4–10)

## 2023-06-05 RX ADMIN — PIPERACILLIN AND TAZOBACTAM SCH MLS/HR: 4; .5 INJECTION, POWDER, LYOPHILIZED, FOR SOLUTION INTRAVENOUS at 18:05

## 2023-06-05 RX ADMIN — ENOXAPARIN SODIUM SCH MG: 40 INJECTION SUBCUTANEOUS at 09:05

## 2023-06-05 RX ADMIN — PIPERACILLIN AND TAZOBACTAM SCH MLS/HR: 4; .5 INJECTION, POWDER, LYOPHILIZED, FOR SOLUTION INTRAVENOUS at 01:36

## 2023-06-05 RX ADMIN — DEXTROSE AND SODIUM CHLORIDE SCH MLS/HR: 5; 450 INJECTION, SOLUTION INTRAVENOUS at 01:40

## 2023-06-05 RX ADMIN — DEXTROSE AND SODIUM CHLORIDE SCH: 5; 450 INJECTION, SOLUTION INTRAVENOUS at 18:08

## 2023-06-05 RX ADMIN — PIPERACILLIN AND TAZOBACTAM SCH MLS/HR: 4; .5 INJECTION, POWDER, LYOPHILIZED, FOR SOLUTION INTRAVENOUS at 09:05

## 2023-06-06 RX ADMIN — AMOXICILLIN AND CLAVULANATE POTASSIUM SCH TAB: 875; 125 TABLET, FILM COATED ORAL at 17:06

## 2023-06-06 RX ADMIN — ENOXAPARIN SODIUM SCH MG: 40 INJECTION SUBCUTANEOUS at 10:19

## 2023-06-06 RX ADMIN — PIPERACILLIN AND TAZOBACTAM SCH MLS/HR: 4; .5 INJECTION, POWDER, LYOPHILIZED, FOR SOLUTION INTRAVENOUS at 02:19

## 2023-06-06 RX ADMIN — PANTOPRAZOLE SODIUM SCH MG: 20 TABLET, DELAYED RELEASE ORAL at 10:19

## 2023-06-06 RX ADMIN — PIPERACILLIN AND TAZOBACTAM SCH MLS/HR: 4; .5 INJECTION, POWDER, LYOPHILIZED, FOR SOLUTION INTRAVENOUS at 10:19

## 2023-06-06 RX ADMIN — PANTOPRAZOLE SODIUM SCH MG: 20 TABLET, DELAYED RELEASE ORAL at 21:50

## 2023-06-07 VITALS
RESPIRATION RATE: 19 BRPM | SYSTOLIC BLOOD PRESSURE: 140 MMHG | TEMPERATURE: 97.5 F | DIASTOLIC BLOOD PRESSURE: 76 MMHG | HEART RATE: 81 BPM

## 2023-06-07 LAB
ALBUMIN SERPL-MCNC: 3.6 G/DL (ref 3.4–5)
ALP SERPL-CCNC: 65 U/L (ref 45–117)
ALT SERPL-CCNC: 20 U/L (ref 13–61)
ANION GAP SERPL CALC-SCNC: 7 MMOL/L (ref 8–16)
AST SERPL-CCNC: 8 U/L (ref 15–37)
BILIRUB SERPL-MCNC: 0.6 MG/DL (ref 0.2–1)
BUN SERPL-MCNC: 6.9 MG/DL (ref 7–18)
CALCIUM SERPL-MCNC: 9.3 MG/DL (ref 8.5–10.1)
CHLORIDE SERPL-SCNC: 108 MMOL/L (ref 98–107)
CO2 SERPL-SCNC: 28 MMOL/L (ref 21–32)
CREAT SERPL-MCNC: 0.7 MG/DL (ref 0.55–1.3)
DEPRECATED RDW RBC AUTO: 14.4 % (ref 11.6–15.6)
GLUCOSE SERPL-MCNC: 97 MG/DL (ref 74–106)
HCT VFR BLD CALC: 39.1 % (ref 32.4–45.2)
HGB BLD-MCNC: 13.1 GM/DL (ref 10.7–15.3)
MCH RBC QN AUTO: 30.2 PG (ref 25.7–33.7)
MCHC RBC AUTO-ENTMCNC: 33.5 G/DL (ref 32–36)
MCV RBC: 90.2 FL (ref 80–96)
PLATELET # BLD AUTO: 288 10^3/UL (ref 134–434)
PMV BLD: 7.8 FL (ref 7.5–11.1)
POTASSIUM SERPLBLD-SCNC: 4.1 MMOL/L (ref 3.5–5.1)
PROT SERPL-MCNC: 7.2 G/DL (ref 6.4–8.2)
RBC # BLD AUTO: 4.33 M/MM3 (ref 3.6–5.2)
SODIUM SERPL-SCNC: 143 MMOL/L (ref 136–145)
WBC # BLD AUTO: 6.6 K/MM3 (ref 4–10)

## 2023-06-07 RX ADMIN — PANTOPRAZOLE SODIUM SCH MG: 20 TABLET, DELAYED RELEASE ORAL at 09:09

## 2023-06-07 RX ADMIN — ENOXAPARIN SODIUM SCH MG: 40 INJECTION SUBCUTANEOUS at 09:09

## 2023-06-07 RX ADMIN — AMOXICILLIN AND CLAVULANATE POTASSIUM SCH TAB: 875; 125 TABLET, FILM COATED ORAL at 09:09

## 2023-06-16 ENCOUNTER — APPOINTMENT (OUTPATIENT)
Dept: OPHTHALMOLOGY | Facility: CLINIC | Age: 78
End: 2023-06-16

## 2023-06-30 ENCOUNTER — NON-APPOINTMENT (OUTPATIENT)
Age: 78
End: 2023-06-30

## 2023-06-30 ENCOUNTER — APPOINTMENT (OUTPATIENT)
Dept: OPHTHALMOLOGY | Facility: CLINIC | Age: 78
End: 2023-06-30
Payer: MEDICARE

## 2023-06-30 PROCEDURE — 92014 COMPRE OPH EXAM EST PT 1/>: CPT

## 2023-09-08 ENCOUNTER — HOSPITAL ENCOUNTER (EMERGENCY)
Dept: HOSPITAL 74 - JER | Age: 78
Discharge: HOME | End: 2023-09-08
Payer: COMMERCIAL

## 2023-09-08 VITALS — HEART RATE: 92 BPM | TEMPERATURE: 99.1 F | SYSTOLIC BLOOD PRESSURE: 138 MMHG | DIASTOLIC BLOOD PRESSURE: 74 MMHG

## 2023-09-08 VITALS — BODY MASS INDEX: 33.1 KG/M2

## 2023-09-08 VITALS — RESPIRATION RATE: 18 BRPM

## 2023-09-08 DIAGNOSIS — R19.7: ICD-10-CM

## 2023-09-08 DIAGNOSIS — R35.0: ICD-10-CM

## 2023-09-08 DIAGNOSIS — R11.0: ICD-10-CM

## 2023-09-08 DIAGNOSIS — R05.9: ICD-10-CM

## 2023-09-08 DIAGNOSIS — U07.1: ICD-10-CM

## 2023-09-08 DIAGNOSIS — R50.9: Primary | ICD-10-CM

## 2023-09-08 DIAGNOSIS — R51.9: ICD-10-CM

## 2023-09-08 LAB
ALBUMIN SERPL-MCNC: 4.1 G/DL (ref 3.4–5)
ALP SERPL-CCNC: 90 U/L (ref 45–117)
ALT SERPL-CCNC: 23 U/L (ref 13–61)
ANION GAP SERPL CALC-SCNC: 8 MMOL/L (ref 8–16)
APPEARANCE UR: CLEAR
AST SERPL-CCNC: 20 U/L (ref 15–37)
BACTERIA # UR AUTO: 37 /UL (ref 0–1359)
BASOPHILS # BLD: 0.4 % (ref 0–2)
BILIRUB SERPL-MCNC: 0.5 MG/DL (ref 0.2–1)
BILIRUB UR STRIP.AUTO-MCNC: NEGATIVE MG/DL
BUN SERPL-MCNC: 10.3 MG/DL (ref 7–18)
CALCIUM SERPL-MCNC: 9.3 MG/DL (ref 8.5–10.1)
CASTS URNS QL MICRO: 0 /UL (ref 0–3.1)
CHLORIDE SERPL-SCNC: 106 MMOL/L (ref 98–107)
CO2 SERPL-SCNC: 26 MMOL/L (ref 21–32)
COLOR UR: YELLOW
CREAT SERPL-MCNC: 0.9 MG/DL (ref 0.55–1.3)
DEPRECATED RDW RBC AUTO: 13.6 % (ref 11.6–15.6)
EOSINOPHIL # BLD: 0.1 % (ref 0–4.5)
EPITH CASTS URNS QL MICRO: 8 /UL (ref 0–25.1)
GLUCOSE SERPL-MCNC: 108 MG/DL (ref 74–106)
HCT VFR BLD CALC: 40.9 % (ref 32.4–45.2)
HGB BLD-MCNC: 13.8 GM/DL (ref 10.7–15.3)
KETONES UR QL STRIP: NEGATIVE
LEUKOCYTE ESTERASE UR QL STRIP.AUTO: NEGATIVE
LYMPHOCYTES # BLD: 7.6 % (ref 8–40)
MAGNESIUM SERPL-MCNC: 2.1 MG/DL (ref 1.8–2.4)
MCH RBC QN AUTO: 30.1 PG (ref 25.7–33.7)
MCHC RBC AUTO-ENTMCNC: 33.7 G/DL (ref 32–36)
MCV RBC: 89.3 FL (ref 80–96)
MONOCYTES # BLD AUTO: 9.5 % (ref 3.8–10.2)
NEUTROPHILS # BLD: 82.4 % (ref 42.8–82.8)
NITRITE UR QL STRIP: NEGATIVE
PH UR: 5 [PH] (ref 5–8)
PHOSPHATE SERPL-MCNC: 3.7 MG/DL (ref 2.5–4.9)
PLATELET # BLD AUTO: 239 10^3/UL (ref 134–434)
PMV BLD: 7.9 FL (ref 7.5–11.1)
POTASSIUM SERPLBLD-SCNC: 4.1 MMOL/L (ref 3.5–5.1)
PROT SERPL-MCNC: 7.9 G/DL (ref 6.4–8.2)
PROT UR QL STRIP: NEGATIVE
PROT UR QL STRIP: NEGATIVE
RBC # BLD AUTO: 30 /UL (ref 0–23.9)
RBC # BLD AUTO: 4.58 M/MM3 (ref 3.6–5.2)
SODIUM SERPL-SCNC: 140 MMOL/L (ref 136–145)
SP GR UR: 1.02 (ref 1.01–1.03)
UROBILINOGEN UR STRIP-MCNC: 0.2 MG/DL (ref 0.2–1)
WBC # BLD AUTO: 7.9 K/MM3 (ref 4–10)
WBC # UR AUTO: 8 /UL (ref 0–25.8)

## 2023-09-08 PROCEDURE — 3E033GC INTRODUCTION OF OTHER THERAPEUTIC SUBSTANCE INTO PERIPHERAL VEIN, PERCUTANEOUS APPROACH: ICD-10-PCS

## 2023-12-15 ENCOUNTER — APPOINTMENT (OUTPATIENT)
Dept: OPHTHALMOLOGY | Facility: CLINIC | Age: 78
End: 2023-12-15

## 2024-08-23 ENCOUNTER — APPOINTMENT (OUTPATIENT)
Dept: OPHTHALMOLOGY | Facility: CLINIC | Age: 79
End: 2024-08-23
Payer: MEDICARE

## 2024-08-23 ENCOUNTER — NON-APPOINTMENT (OUTPATIENT)
Age: 79
End: 2024-08-23

## 2024-08-23 PROCEDURE — 92014 COMPRE OPH EXAM EST PT 1/>: CPT

## 2024-08-23 PROCEDURE — 92083 EXTENDED VISUAL FIELD XM: CPT

## 2024-08-23 PROCEDURE — 92133 CPTRZD OPH DX IMG PST SGM ON: CPT

## 2025-01-03 ENCOUNTER — OFFICE (OUTPATIENT)
Facility: LOCATION | Age: 80
Setting detail: OPHTHALMOLOGY
End: 2025-01-03
Payer: MEDICARE

## 2025-01-03 DIAGNOSIS — H00.11: ICD-10-CM

## 2025-01-03 DIAGNOSIS — H25.093: ICD-10-CM

## 2025-01-03 DIAGNOSIS — H11.153: ICD-10-CM

## 2025-01-03 PROCEDURE — 92002 INTRM OPH EXAM NEW PATIENT: CPT | Performed by: OPHTHALMOLOGY

## 2025-01-03 ASSESSMENT — REFRACTION_AUTOREFRACTION
OS_CYLINDER: +1.25
OS_AXIS: 138
OS_SPHERE: -1.00
OD_AXIS: 30
OD_SPHERE: -0.75
OD_CYLINDER: +1.25

## 2025-01-03 ASSESSMENT — VISUAL ACUITY
OS_BCVA: 20/70-2
OD_BCVA: 20/60-1

## 2025-01-03 ASSESSMENT — CONFRONTATIONAL VISUAL FIELD TEST (CVF)
OS_FINDINGS: FULL
OD_FINDINGS: FULL

## 2025-02-07 ENCOUNTER — APPOINTMENT (OUTPATIENT)
Dept: OPHTHALMOLOGY | Facility: CLINIC | Age: 80
End: 2025-02-07
Payer: MEDICARE

## 2025-02-07 ENCOUNTER — NON-APPOINTMENT (OUTPATIENT)
Age: 80
End: 2025-02-07

## 2025-02-07 PROCEDURE — 92012 INTRM OPH EXAM EST PATIENT: CPT

## 2025-02-07 PROCEDURE — 92134 CPTRZ OPH DX IMG PST SGM RTA: CPT

## 2025-02-07 PROCEDURE — 92136 OPHTHALMIC BIOMETRY: CPT

## 2025-04-24 ENCOUNTER — HOSPITAL ENCOUNTER (EMERGENCY)
Dept: HOSPITAL 74 - JER | Age: 80
Discharge: HOME | End: 2025-04-24
Payer: COMMERCIAL

## 2025-04-24 VITALS — TEMPERATURE: 97.6 F

## 2025-04-24 VITALS — HEART RATE: 72 BPM

## 2025-04-24 VITALS — BODY MASS INDEX: 29.9 KG/M2

## 2025-04-24 VITALS — SYSTOLIC BLOOD PRESSURE: 133 MMHG | DIASTOLIC BLOOD PRESSURE: 74 MMHG | RESPIRATION RATE: 18 BRPM

## 2025-04-24 DIAGNOSIS — G89.29: ICD-10-CM

## 2025-04-24 DIAGNOSIS — R05.9: ICD-10-CM

## 2025-04-24 DIAGNOSIS — R07.2: Primary | ICD-10-CM

## 2025-04-24 DIAGNOSIS — M54.6: ICD-10-CM

## 2025-04-24 LAB
ALBUMIN SERPL-MCNC: 3.8 G/DL (ref 3.4–5)
APTT BLD: 26.9 SECONDS (ref 25.2–36.5)
BASOPHILS # BLD AUTO: 0.03 X10^3/UL (ref 0.01–0.08)
BILIRUB SERPL-MCNC: 0.3 MG/DL (ref 0.2–1)
BUN SERPL-MCNC: 11.9 MG/DL (ref 7–18)
CALCIUM SERPL-MCNC: 9.3 MG/DL (ref 8.5–10.1)
CREAT SERPL-MCNC: 0.9 MG/DL (ref 0.55–1.3)
EOSINOPHIL # BLD AUTO: 0.15 X10^3/UL (ref 0.04–0.36)
EOSINOPHIL NFR BLD AUTO: 1.8 % (ref 0.7–5.8)
ERYTHROCYTE [DISTWIDTH] IN BLOOD: 13.6 % (ref 12.4–16.6)
HCT VFR BLD CALC: 40.2 % (ref 34.1–44.9)
HGB BLD-MCNC: 12.8 G/DL (ref 11.2–15.7)
HIV 1+2 AB+HIV1 P24 AG SERPL QL IA: NEGATIVE
IMM GRANULOCYTES # BLD: 0.02 X10^3/UL (ref 0–0.03)
INR BLD: 1.15 (ref 0.83–1.09)
MAGNESIUM SERPL-MCNC: 2.2 MG/DL (ref 1.8–2.4)
MCHC RBC-ENTMCNC: 31.8 G/DL (ref 32.2–35.5)
MCV RBC: 94.1 FL (ref 79.4–94.8)
MONOCYTES # BLD AUTO: 0.47 X10^3/UL (ref 0.24–0.86)
MONOCYTES NFR BLD AUTO: 5.8 % (ref 4.7–12.5)
PLATELET # BLD AUTO: 199 X10^3/UL (ref 182–369)
PMV BLD: 10.4 FL (ref 9.4–12.3)
POTASSIUM SERPLBLD-SCNC: 4.2 MMOL/L (ref 3.5–5.1)
PROT SERPL-MCNC: 7.2 G/DL (ref 6.4–8.2)
PT PNL PPP: 12.6 SEC (ref 9.7–13)

## 2025-04-24 RX ADMIN — ALUMINUM HYDROXIDE, MAGNESIUM HYDROXIDE, AND SIMETHICONE ONE ML: 200; 200; 20 SUSPENSION ORAL at 16:05

## 2025-07-15 ENCOUNTER — OFFICE (OUTPATIENT)
Facility: LOCATION | Age: 80
Setting detail: OPHTHALMOLOGY
End: 2025-07-15
Payer: MEDICARE

## 2025-07-15 DIAGNOSIS — H00.11: ICD-10-CM

## 2025-07-15 DIAGNOSIS — H25.093: ICD-10-CM

## 2025-07-15 DIAGNOSIS — H35.013: ICD-10-CM

## 2025-07-15 DIAGNOSIS — H11.153: ICD-10-CM

## 2025-07-15 PROCEDURE — 92014 COMPRE OPH EXAM EST PT 1/>: CPT | Performed by: OPHTHALMOLOGY

## 2025-07-15 ASSESSMENT — VISUAL ACUITY
OD_BCVA: 20/50-1
OS_BCVA: 20/50-2

## 2025-07-15 ASSESSMENT — REFRACTION_CURRENTRX
OD_SPHERE: -0.25
OS_SPHERE: PLANO
OD_OVR_VA: 20/
OS_OVR_VA: 20/
OD_OVR_VA: 20/
OD_AXIS: 176
OD_CYLINDER: +1.00
OD_SPHERE: +2.50
OS_SPHERE: +2.50
OD_CYLINDER: SPH
OS_OVR_VA: 20/
OS_CYLINDER: SPH
OS_CYLINDER: +1.00
OS_AXIS: 177

## 2025-07-15 ASSESSMENT — REFRACTION_AUTOREFRACTION
OD_AXIS: 30
OD_SPHERE: -0.75
OD_CYLINDER: +0.50
OS_CYLINDER: +1.50
OS_AXIS: 151
OS_SPHERE: -1.00

## 2025-07-15 ASSESSMENT — REFRACTION_MANIFEST
OS_SPHERE: -1.00
OD_CYLINDER: +0.50
OD_VA1: 20/50-1
OD_SPHERE: -0.75
OS_VA1: 20/50-2
OS_AXIS: 151
OS_CYLINDER: +1.50
OD_AXIS: 30

## 2025-07-15 ASSESSMENT — CONFRONTATIONAL VISUAL FIELD TEST (CVF)
OD_FINDINGS: FULL
OS_FINDINGS: FULL